# Patient Record
Sex: FEMALE | Race: WHITE | NOT HISPANIC OR LATINO | Employment: FULL TIME | URBAN - NONMETROPOLITAN AREA
[De-identification: names, ages, dates, MRNs, and addresses within clinical notes are randomized per-mention and may not be internally consistent; named-entity substitution may affect disease eponyms.]

---

## 2017-12-20 ENCOUNTER — OFFICE VISIT (OUTPATIENT)
Dept: RETAIL CLINIC | Facility: CLINIC | Age: 32
End: 2017-12-20

## 2017-12-20 VITALS
BODY MASS INDEX: 32.21 KG/M2 | SYSTOLIC BLOOD PRESSURE: 136 MMHG | TEMPERATURE: 100.5 F | HEART RATE: 95 BPM | RESPIRATION RATE: 20 BRPM | OXYGEN SATURATION: 99 % | DIASTOLIC BLOOD PRESSURE: 78 MMHG | WEIGHT: 225 LBS | HEIGHT: 70 IN

## 2017-12-20 DIAGNOSIS — J10.1 INFLUENZA A: Primary | ICD-10-CM

## 2017-12-20 LAB
EXPIRATION DATE: ABNORMAL
FLUAV AG NPH QL: POSITIVE
FLUBV AG NPH QL: ABNORMAL
INTERNAL CONTROL: ABNORMAL
Lab: ABNORMAL

## 2017-12-20 PROCEDURE — 99203 OFFICE O/P NEW LOW 30 MIN: CPT | Performed by: NURSE PRACTITIONER

## 2017-12-20 PROCEDURE — 87804 INFLUENZA ASSAY W/OPTIC: CPT | Performed by: NURSE PRACTITIONER

## 2017-12-20 RX ORDER — OSELTAMIVIR PHOSPHATE 75 MG/1
75 CAPSULE ORAL
Qty: 10 CAPSULE | Refills: 0 | Status: SHIPPED | OUTPATIENT
Start: 2017-12-20 | End: 2017-12-25

## 2017-12-20 NOTE — PROGRESS NOTES
Chief Complaint   Patient presents with   • Flu Symptoms     Subjective   Lynne Coombs is a 32 y.o. female who presents to the clinic today with complaints flu symptoms. She is on a   Flu Symptoms   This is a new problem. The current episode started yesterday. The problem occurs constantly. The problem has been gradually worsening. Associated symptoms include arthralgias, chills, congestion, coughing, fatigue, a fever, headaches, myalgias, neck pain, a rash, a sore throat, swollen glands, urinary symptoms, vertigo, a visual change, vomiting and weakness. Pertinent negatives include no abdominal pain, anorexia, change in bowel habit, chest pain, diaphoresis, nausea or numbness. Nothing aggravates the symptoms. She has tried NSAIDs for the symptoms. The treatment provided mild relief.       No current outpatient prescriptions on file.    Allergies:  Hydrocodone    History reviewed. No pertinent past medical history.  Past Surgical History:   Procedure Laterality Date   • BREAST SURGERY      augmentation      Family History   Problem Relation Age of Onset   • Diabetes Father      Social History   Substance Use Topics   • Smoking status: Current Every Day Smoker     Types: Cigarettes   • Smokeless tobacco: Never Used   • Alcohol use No       Review of Systems  Review of Systems   Constitutional: Positive for chills, fatigue and fever. Negative for diaphoresis.   HENT: Positive for congestion and sore throat.    Respiratory: Positive for cough.    Cardiovascular: Negative for chest pain.   Gastrointestinal: Positive for vomiting. Negative for abdominal pain, anorexia, change in bowel habit and nausea.   Musculoskeletal: Positive for arthralgias, myalgias and neck pain.   Skin: Positive for rash.   Neurological: Positive for vertigo, weakness and headaches. Negative for numbness.       Objective   /78 (BP Location: Left arm, Patient Position: Sitting, Cuff Size: Adult)  Pulse 95  Temp 100.5 °F (38.1 °C)  "(Tympanic)   Resp 20  Ht 176.8 cm (69.6\")  Wt 102 kg (225 lb)  LMP 11/15/2017 (Approximate)  SpO2 99%  Breastfeeding? No  BMI 32.66 kg/m2      Physical Exam   Constitutional: She is oriented to person, place, and time. She appears well-developed and well-nourished.   HENT:   Head: Normocephalic and atraumatic.   Right Ear: Hearing, external ear and ear canal normal. Tympanic membrane is bulging.   Left Ear: Hearing, external ear and ear canal normal. Tympanic membrane is bulging.   Nose: Mucosal edema and rhinorrhea present. Right sinus exhibits no maxillary sinus tenderness and no frontal sinus tenderness. Left sinus exhibits frontal sinus tenderness. Left sinus exhibits no maxillary sinus tenderness.   Mouth/Throat: Uvula is midline and mucous membranes are normal. Posterior oropharyngeal erythema present. No oropharyngeal exudate or posterior oropharyngeal edema. Tonsils are 1+ on the right. Tonsils are 1+ on the left. No tonsillar exudate.   Eyes: Pupils are equal, round, and reactive to light.   Neck: Normal range of motion. Neck supple.   Cardiovascular: Normal rate, regular rhythm and normal heart sounds.  Exam reveals no gallop and no friction rub.    No murmur heard.  Pulmonary/Chest: Effort normal and breath sounds normal. No stridor. No respiratory distress. She has no wheezes. She has no rales. She exhibits no tenderness.   Lymphadenopathy:     She has no cervical adenopathy.   Neurological: She is alert and oriented to person, place, and time.   Skin: Skin is warm and dry.   Psychiatric: She has a normal mood and affect. Her behavior is normal.   Vitals reviewed.      Assessment/Plan     Lynne was seen today for flu symptoms.    Diagnoses and all orders for this visit:    Influenza A  -     POCT Influenza A/B          "

## 2018-07-05 ENCOUNTER — TELEPHONE (OUTPATIENT)
Dept: OBSTETRICS AND GYNECOLOGY | Facility: CLINIC | Age: 33
End: 2018-07-05

## 2018-07-05 NOTE — TELEPHONE ENCOUNTER
Dr. Mcdonnell-- new pt new ob calling, LMP unknown (about 5 weeks ago). 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 Pt states that she got a positive pregnancy test last night and would like to be seen. Pt states that she also does heavy lifting at work and states that she needs something stating that she cannot do any heavy lifting before she leaves on Monday. Pt's #  [phone]

## 2018-08-07 ENCOUNTER — TELEPHONE (OUTPATIENT)
Dept: OBSTETRICS AND GYNECOLOGY | Facility: CLINIC | Age: 33
End: 2018-08-07

## 2018-08-07 DIAGNOSIS — Z34.90 PREGNANCY, UNSPECIFIED GESTATIONAL AGE: Primary | ICD-10-CM

## 2018-08-13 ENCOUNTER — OFFICE VISIT (OUTPATIENT)
Dept: OBSTETRICS AND GYNECOLOGY | Facility: CLINIC | Age: 33
End: 2018-08-13
Attending: STUDENT IN AN ORGANIZED HEALTH CARE EDUCATION/TRAINING PROGRAM
Payer: COMMERCIAL

## 2018-08-13 ENCOUNTER — PROCEDURE VISIT (OUTPATIENT)
Dept: OBSTETRICS AND GYNECOLOGY | Facility: CLINIC | Age: 33
End: 2018-08-13
Payer: COMMERCIAL

## 2018-08-13 ENCOUNTER — LAB VISIT (OUTPATIENT)
Dept: LAB | Facility: HOSPITAL | Age: 33
End: 2018-08-13
Attending: STUDENT IN AN ORGANIZED HEALTH CARE EDUCATION/TRAINING PROGRAM
Payer: COMMERCIAL

## 2018-08-13 VITALS
SYSTOLIC BLOOD PRESSURE: 132 MMHG | BODY MASS INDEX: 36.07 KG/M2 | WEIGHT: 243.5 LBS | DIASTOLIC BLOOD PRESSURE: 68 MMHG | HEIGHT: 69 IN

## 2018-08-13 DIAGNOSIS — Z11.3 SCREENING EXAMINATION FOR STD (SEXUALLY TRANSMITTED DISEASE): ICD-10-CM

## 2018-08-13 DIAGNOSIS — Z34.90 PREGNANCY, UNSPECIFIED GESTATIONAL AGE: ICD-10-CM

## 2018-08-13 DIAGNOSIS — Z3A.12 12 WEEKS GESTATION OF PREGNANCY: ICD-10-CM

## 2018-08-13 DIAGNOSIS — Z3A.12 12 WEEKS GESTATION OF PREGNANCY: Primary | ICD-10-CM

## 2018-08-13 LAB
ANION GAP SERPL CALC-SCNC: 10 MMOL/L
BASOPHILS # BLD AUTO: 0.03 K/UL
BASOPHILS NFR BLD: 0.3 %
BUN SERPL-MCNC: 12 MG/DL
CALCIUM SERPL-MCNC: 9.5 MG/DL
CHLORIDE SERPL-SCNC: 106 MMOL/L
CO2 SERPL-SCNC: 21 MMOL/L
CREAT SERPL-MCNC: 0.7 MG/DL
DIFFERENTIAL METHOD: ABNORMAL
EOSINOPHIL # BLD AUTO: 0.1 K/UL
EOSINOPHIL NFR BLD: 1.1 %
ERYTHROCYTE [DISTWIDTH] IN BLOOD BY AUTOMATED COUNT: 12.5 %
EST. GFR  (AFRICAN AMERICAN): >60 ML/MIN/1.73 M^2
EST. GFR  (NON AFRICAN AMERICAN): >60 ML/MIN/1.73 M^2
GLUCOSE SERPL-MCNC: 83 MG/DL
HCT VFR BLD AUTO: 35.8 %
HGB BLD-MCNC: 12.1 G/DL
IMM GRANULOCYTES # BLD AUTO: 0.03 K/UL
IMM GRANULOCYTES NFR BLD AUTO: 0.3 %
LYMPHOCYTES # BLD AUTO: 2 K/UL
LYMPHOCYTES NFR BLD: 21.5 %
MCH RBC QN AUTO: 30 PG
MCHC RBC AUTO-ENTMCNC: 33.8 G/DL
MCV RBC AUTO: 89 FL
MONOCYTES # BLD AUTO: 0.6 K/UL
MONOCYTES NFR BLD: 6.2 %
NEUTROPHILS # BLD AUTO: 6.6 K/UL
NEUTROPHILS NFR BLD: 70.6 %
NRBC BLD-RTO: 0 /100 WBC
PLATELET # BLD AUTO: 296 K/UL
PMV BLD AUTO: 9.5 FL
POTASSIUM SERPL-SCNC: 4 MMOL/L
RBC # BLD AUTO: 4.04 M/UL
SODIUM SERPL-SCNC: 137 MMOL/L
T4 FREE SERPL-MCNC: 0.89 NG/DL
TSH SERPL DL<=0.005 MIU/L-ACNC: 1.98 UIU/ML
WBC # BLD AUTO: 9.31 K/UL

## 2018-08-13 PROCEDURE — 85025 COMPLETE CBC W/AUTO DIFF WBC: CPT

## 2018-08-13 PROCEDURE — 3008F BODY MASS INDEX DOCD: CPT | Mod: CPTII,S$GLB,, | Performed by: STUDENT IN AN ORGANIZED HEALTH CARE EDUCATION/TRAINING PROGRAM

## 2018-08-13 PROCEDURE — 87510 GARDNER VAG DNA DIR PROBE: CPT

## 2018-08-13 PROCEDURE — 87480 CANDIDA DNA DIR PROBE: CPT

## 2018-08-13 PROCEDURE — 87340 HEPATITIS B SURFACE AG IA: CPT

## 2018-08-13 PROCEDURE — 84439 ASSAY OF FREE THYROXINE: CPT

## 2018-08-13 PROCEDURE — 86703 HIV-1/HIV-2 1 RESULT ANTBDY: CPT

## 2018-08-13 PROCEDURE — 88175 CYTOPATH C/V AUTO FLUID REDO: CPT

## 2018-08-13 PROCEDURE — 80048 BASIC METABOLIC PNL TOTAL CA: CPT

## 2018-08-13 PROCEDURE — 84443 ASSAY THYROID STIM HORMONE: CPT

## 2018-08-13 PROCEDURE — 87491 CHLMYD TRACH DNA AMP PROBE: CPT

## 2018-08-13 PROCEDURE — 86762 RUBELLA ANTIBODY: CPT

## 2018-08-13 PROCEDURE — 86592 SYPHILIS TEST NON-TREP QUAL: CPT

## 2018-08-13 PROCEDURE — 76801 OB US < 14 WKS SINGLE FETUS: CPT | Mod: S$GLB,,, | Performed by: OBSTETRICS & GYNECOLOGY

## 2018-08-13 PROCEDURE — 87624 HPV HI-RISK TYP POOLED RSLT: CPT

## 2018-08-13 PROCEDURE — 99213 OFFICE O/P EST LOW 20 MIN: CPT | Mod: S$GLB,,, | Performed by: STUDENT IN AN ORGANIZED HEALTH CARE EDUCATION/TRAINING PROGRAM

## 2018-08-13 PROCEDURE — 87086 URINE CULTURE/COLONY COUNT: CPT

## 2018-08-13 PROCEDURE — 99999 PR PBB SHADOW E&M-EST. PATIENT-LVL III: CPT | Mod: PBBFAC,,, | Performed by: STUDENT IN AN ORGANIZED HEALTH CARE EDUCATION/TRAINING PROGRAM

## 2018-08-14 ENCOUNTER — TELEPHONE (OUTPATIENT)
Dept: OBSTETRICS AND GYNECOLOGY | Facility: CLINIC | Age: 33
End: 2018-08-14

## 2018-08-14 LAB
BACTERIA UR CULT: NORMAL
C TRACH DNA SPEC QL NAA+PROBE: NOT DETECTED
CANDIDA RRNA VAG QL PROBE: POSITIVE
G VAGINALIS RRNA GENITAL QL PROBE: NEGATIVE
HBV SURFACE AG SERPL QL IA: NEGATIVE
HIV 1+2 AB+HIV1 P24 AG SERPL QL IA: NEGATIVE
N GONORRHOEA DNA SPEC QL NAA+PROBE: NOT DETECTED
RPR SER QL: NORMAL
RUBV IGG SER-ACNC: 12.7 IU/ML
RUBV IGG SER-IMP: REACTIVE
T VAGINALIS RRNA GENITAL QL PROBE: NEGATIVE

## 2018-08-14 RX ORDER — TERCONAZOLE 4 MG/G
1 CREAM VAGINAL NIGHTLY
Qty: 45 G | Refills: 0 | Status: SHIPPED | OUTPATIENT
Start: 2018-08-14 | End: 2018-09-19

## 2018-08-14 NOTE — TELEPHONE ENCOUNTER
Phone call made to patient to discuss results.  All questions answered.  Rx to pharmacy. GC/CT, pap, type screen pending.

## 2018-08-14 NOTE — PROGRESS NOTES
"Chief Complaint: Absence of Menses     HPI:      Glenis Staples is a 33 y.o.  who presents complaining of absence of menses.  She reports her LMP was sometime in early . Denies vaginal bleeding or cramping.  Nausea resolved.  No other issues or concerns.  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    GYN Hx:  LMP .  Menarche 14 with cycles occurring every 28 days and menses lasting 5 days.   Past Medical History:   Diagnosis Date    Acid reflux     HPV in female      Past Surgical History:   Procedure Laterality Date    BREAST SURGERY       Social History     Tobacco Use    Smoking status: Never Smoker    Smokeless tobacco: Never Used   Substance Use Topics    Alcohol use: No     Frequency: Never    Drug use: No     Family History   Problem Relation Age of Onset    Breast cancer Paternal Grandmother     Colon cancer Neg Hx     Ovarian cancer Neg Hx      OB History    Para Term  AB Living   1             SAB TAB Ectopic Multiple Live Births                  # Outcome Date GA Lbr Kanu/2nd Weight Sex Delivery Anes PTL Lv   1 Current                   ROS:     GENERAL: Denies weight gain or weight loss. Feeling well overall.   SKIN: Denies rash or lesions.   BREASTS: Denies lumps or nipple discharge. + tenderness.  ABDOMEN: Denies abdominal pain, constipation, diarrhea. no nausea/no vomiting  URINARY: Denies dysuria, hematuria. + frequency.  NEUROLOGIC: Denies syncope or weakness.   PSYCHIATRIC: Denies depression, anxiety or mood swings.    Physical Exam:      PHYSICAL EXAM:  /68   Ht 5' 9" (1.753 m)   Wt 110.5 kg (243 lb 8 oz)   LMP 2018 (Approximate)   BMI 35.96 kg/m²   Body mass index is 35.96 kg/m².     APPEARANCE: Well nourished, well developed, in no acute distress.  PSYCH: Appropriate mood and affect.  NECK:  Supple, no thyromegaly.  BREASTS:  No masses, no nipple discharge.  CARDIOVASCULAR:  Regular rate and rhythm.  LUNGS:  Clear to " auscultation bilaterally.  ABDOMEN:  Soft, nontender.  GENITOURINARY:  External genitalia normal in appearance, normal perineal body, normal urethral meatus.  Vagina with scant discharge, no blood.  No lesions.  Cervix without lesion, C/T/H.  Uterus 12 week size.  Adnexa without masses or TTP.    EXTREMITIES: No edema.      Assessment/Plan:       ICD-10-CM ICD-9-CM    1. 12 weeks gestation of pregnancy Z3A.12 V22.2 Liquid-based pap smear, screening      HPV High Risk Genotypes, PCR      C. trachomatis/N. gonorrhoeae by AMP DNA      Urine culture      Vaginosis Screen by DNA Probe      HIV-1 and HIV-2 antibodies      RPR      Hepatitis B surface antigen      Type & Screen      Rubella antibody, IgG      Urine culture      CBC auto differential      Basic metabolic panel      US MFM Procedure ([facility])      TSH      T4, free   2. Screening examination for STD (sexually transmitted disease) Z11.3 V74.5          Counselin. Patient was counseled today on proper weight gain based on the [place]'s recommendations based on her pre-pregnancy weight.   2. Discussed foods to avoid in pregnancy (i.e. sushi, fish that are high in mercury, deli meat, and unpasteurized cheeses).   3. Discussed prenatal vitamin options and folic acid (i.e. stool softener, DHA).   4. Optional genetic testing discussed - patient declines.  5. Safety of exercise discussed with patient, and continued active lifestyle encouraged.  6. Zika virus precautions for both patient and her spouse.  7. Normal course of prenatal care reviewed.  8. Medications safe in pregnancy discussed and list provided.  9.  Exam performed today and initial blood work ordered.  Will follow up results.  All questions answered.     30 minutes of face-to-face discussion occurred during today's visit.     Use of the [facility] Patient Portal discussed and encouraged during today's visit.

## 2018-08-15 ENCOUNTER — TELEPHONE (OUTPATIENT)
Dept: OBSTETRICS AND GYNECOLOGY | Facility: CLINIC | Age: 33
End: 2018-08-15

## 2018-08-15 NOTE — TELEPHONE ENCOUNTER
----- Message from Mindy Pascal MD sent at 8/15/2018  1:46 PM CDT -----  Can we see what happened to her type and screen?  Is it pending?  Just want to make sure I'm not putting things in wrong.  Thanks!

## 2018-08-15 NOTE — TELEPHONE ENCOUNTER
Spoke with Pat at the lab and she confirmed that all orders were linked and in properly but didn't give reason as to why the type and screen wasn't collected. She was absent on 8/13 when pt was seen.

## 2018-08-17 ENCOUNTER — TELEPHONE (OUTPATIENT)
Dept: OBSTETRICS AND GYNECOLOGY | Facility: CLINIC | Age: 33
End: 2018-08-17

## 2018-08-17 LAB
HPV HR 12 DNA CVX QL NAA+PROBE: NEGATIVE
HPV16 AG SPEC QL: NEGATIVE
HPV18 DNA SPEC QL NAA+PROBE: NEGATIVE

## 2018-08-17 NOTE — TELEPHONE ENCOUNTER
----- Message from Midny Pascal MD sent at 8/17/2018  2:03 PM CDT -----  Please call patient:    Your pap smear is back and everything looks completely normal. This is great news! Based on current guidelines you don't need another pap smear for 3 years. We do still recommend that you come back to clinic each year for your annual exam. This gives us a chance to make sure you're doing well and no problems have developed since your last visit.  Please let me know if you have any questions or concerns.  We will see her soon.  Thanks!

## 2018-09-19 ENCOUNTER — TELEPHONE (OUTPATIENT)
Dept: OBSTETRICS AND GYNECOLOGY | Facility: CLINIC | Age: 33
End: 2018-09-19

## 2018-09-19 ENCOUNTER — ROUTINE PRENATAL (OUTPATIENT)
Dept: OBSTETRICS AND GYNECOLOGY | Facility: CLINIC | Age: 33
End: 2018-09-19
Attending: STUDENT IN AN ORGANIZED HEALTH CARE EDUCATION/TRAINING PROGRAM
Payer: COMMERCIAL

## 2018-09-19 VITALS
SYSTOLIC BLOOD PRESSURE: 126 MMHG | BODY MASS INDEX: 36.46 KG/M2 | DIASTOLIC BLOOD PRESSURE: 78 MMHG | WEIGHT: 246.94 LBS

## 2018-09-19 DIAGNOSIS — Z3A.17 17 WEEKS GESTATION OF PREGNANCY: Primary | ICD-10-CM

## 2018-09-19 PROCEDURE — 90471 IMMUNIZATION ADMIN: CPT | Mod: S$GLB,,, | Performed by: STUDENT IN AN ORGANIZED HEALTH CARE EDUCATION/TRAINING PROGRAM

## 2018-09-19 PROCEDURE — 99999 PR PBB SHADOW E&M-EST. PATIENT-LVL II: CPT | Mod: PBBFAC,,, | Performed by: STUDENT IN AN ORGANIZED HEALTH CARE EDUCATION/TRAINING PROGRAM

## 2018-09-19 PROCEDURE — 90686 IIV4 VACC NO PRSV 0.5 ML IM: CPT | Mod: S$GLB,,, | Performed by: STUDENT IN AN ORGANIZED HEALTH CARE EDUCATION/TRAINING PROGRAM

## 2018-09-19 PROCEDURE — 0502F SUBSEQUENT PRENATAL CARE: CPT | Mod: S$GLB,,, | Performed by: STUDENT IN AN ORGANIZED HEALTH CARE EDUCATION/TRAINING PROGRAM

## 2018-09-19 NOTE — PROGRESS NOTES
Doing well.  No complaints.  No cramping or bleeding.  Flu today.  Anatomy ordered.  All questions answered.  RTC in 4 weeks or sooner if needed.

## 2018-10-03 ENCOUNTER — PROCEDURE VISIT (OUTPATIENT)
Dept: MATERNAL FETAL MEDICINE | Facility: CLINIC | Age: 33
End: 2018-10-03
Attending: STUDENT IN AN ORGANIZED HEALTH CARE EDUCATION/TRAINING PROGRAM
Payer: COMMERCIAL

## 2018-10-03 DIAGNOSIS — Z36.89 ENCOUNTER FOR FETAL ANATOMIC SURVEY: ICD-10-CM

## 2018-10-03 DIAGNOSIS — Z3A.12 12 WEEKS GESTATION OF PREGNANCY: ICD-10-CM

## 2018-10-03 DIAGNOSIS — O99.210 MATERNAL OBESITY AFFECTING PREGNANCY, ANTEPARTUM: ICD-10-CM

## 2018-10-03 DIAGNOSIS — Z36.89 ENCOUNTER FOR ULTRASOUND TO CHECK FETAL GROWTH: Primary | ICD-10-CM

## 2018-10-03 PROCEDURE — 76811 OB US DETAILED SNGL FETUS: CPT | Mod: S$GLB,,, | Performed by: OBSTETRICS & GYNECOLOGY

## 2018-10-03 PROCEDURE — 99499 UNLISTED E&M SERVICE: CPT | Mod: S$GLB,,, | Performed by: OBSTETRICS & GYNECOLOGY

## 2018-10-10 ENCOUNTER — ROUTINE PRENATAL (OUTPATIENT)
Dept: OBSTETRICS AND GYNECOLOGY | Facility: CLINIC | Age: 33
End: 2018-10-10
Attending: STUDENT IN AN ORGANIZED HEALTH CARE EDUCATION/TRAINING PROGRAM
Payer: COMMERCIAL

## 2018-10-10 ENCOUNTER — PATIENT MESSAGE (OUTPATIENT)
Dept: ADMINISTRATIVE | Facility: OTHER | Age: 33
End: 2018-10-10

## 2018-10-10 ENCOUNTER — LAB VISIT (OUTPATIENT)
Dept: LAB | Facility: HOSPITAL | Age: 33
End: 2018-10-10
Attending: STUDENT IN AN ORGANIZED HEALTH CARE EDUCATION/TRAINING PROGRAM
Payer: COMMERCIAL

## 2018-10-10 VITALS
DIASTOLIC BLOOD PRESSURE: 84 MMHG | WEIGHT: 251.13 LBS | BODY MASS INDEX: 37.08 KG/M2 | SYSTOLIC BLOOD PRESSURE: 126 MMHG

## 2018-10-10 DIAGNOSIS — Z3A.20 20 WEEKS GESTATION OF PREGNANCY: Primary | ICD-10-CM

## 2018-10-10 DIAGNOSIS — Z3A.20 20 WEEKS GESTATION OF PREGNANCY: ICD-10-CM

## 2018-10-10 LAB
ABO + RH BLD: NORMAL
BLD GP AB SCN CELLS X3 SERPL QL: NORMAL

## 2018-10-10 PROCEDURE — 86901 BLOOD TYPING SEROLOGIC RH(D): CPT

## 2018-10-10 PROCEDURE — 0502F SUBSEQUENT PRENATAL CARE: CPT | Mod: S$GLB,,, | Performed by: STUDENT IN AN ORGANIZED HEALTH CARE EDUCATION/TRAINING PROGRAM

## 2018-10-10 PROCEDURE — 99999 PR PBB SHADOW E&M-EST. PATIENT-LVL III: CPT | Mod: PBBFAC,,, | Performed by: STUDENT IN AN ORGANIZED HEALTH CARE EDUCATION/TRAINING PROGRAM

## 2018-10-10 NOTE — PROGRESS NOTES
Doing well.  No complaints.  Reports feeling some kicks.  Denies contractions, leakage of fluid, vaginal bleeding.  Is now considering [id number] testing.  Information given and patient will call with what she decides.  All questions answered.  RTC in 4 weeks or sooner if needed.

## 2018-10-11 ENCOUNTER — PATIENT OUTREACH (OUTPATIENT)
Dept: OTHER | Facility: OTHER | Age: 33
End: 2018-10-11

## 2018-10-11 NOTE — TELEPHONE ENCOUNTER
Initial introduction with Ms. Glenis Staples completed and the role of the health coaches for [facility] was explained via [facility].     Reviewed the importance of taking weights and blood pressure readings, using the health  as a resource for physical activity and dietary habits, and that [facility] messages will be sent for weeks 20, 30, and 37 home urine tests.  Reviewed that the patient should contact her OB team with any specific questions regarding her pregnancy, and to contact [facility] or 911 in the case of a medical emergency.

## 2018-10-29 ENCOUNTER — ROUTINE PRENATAL (OUTPATIENT)
Dept: OBSTETRICS AND GYNECOLOGY | Facility: CLINIC | Age: 33
End: 2018-10-29
Attending: STUDENT IN AN ORGANIZED HEALTH CARE EDUCATION/TRAINING PROGRAM
Payer: COMMERCIAL

## 2018-10-29 ENCOUNTER — PATIENT MESSAGE (OUTPATIENT)
Dept: OBSTETRICS AND GYNECOLOGY | Facility: CLINIC | Age: 33
End: 2018-10-29

## 2018-10-29 ENCOUNTER — PROCEDURE VISIT (OUTPATIENT)
Dept: MATERNAL FETAL MEDICINE | Facility: CLINIC | Age: 33
End: 2018-10-29
Attending: STUDENT IN AN ORGANIZED HEALTH CARE EDUCATION/TRAINING PROGRAM
Payer: COMMERCIAL

## 2018-10-29 VITALS
DIASTOLIC BLOOD PRESSURE: 76 MMHG | BODY MASS INDEX: 38.89 KG/M2 | WEIGHT: 263.31 LBS | SYSTOLIC BLOOD PRESSURE: 126 MMHG

## 2018-10-29 DIAGNOSIS — O99.210 MATERNAL OBESITY AFFECTING PREGNANCY, ANTEPARTUM: ICD-10-CM

## 2018-10-29 DIAGNOSIS — Z36.89 ENCOUNTER FOR ULTRASOUND TO CHECK FETAL GROWTH: Primary | ICD-10-CM

## 2018-10-29 DIAGNOSIS — Z36.89 ENCOUNTER FOR ULTRASOUND TO CHECK FETAL GROWTH: ICD-10-CM

## 2018-10-29 DIAGNOSIS — O12.02 LEG SWELLING IN PREGNANCY IN SECOND TRIMESTER: ICD-10-CM

## 2018-10-29 DIAGNOSIS — Z3A.23 23 WEEKS GESTATION OF PREGNANCY: Primary | ICD-10-CM

## 2018-10-29 DIAGNOSIS — Z34.02 ENCOUNTER FOR SUPERVISION OF NORMAL FIRST PREGNANCY, SECOND TRIMESTER: ICD-10-CM

## 2018-10-29 PROCEDURE — 0502F SUBSEQUENT PRENATAL CARE: CPT | Mod: S$GLB,,, | Performed by: NURSE PRACTITIONER

## 2018-10-29 PROCEDURE — 76816 OB US FOLLOW-UP PER FETUS: CPT | Mod: S$GLB,,, | Performed by: OBSTETRICS & GYNECOLOGY

## 2018-10-29 PROCEDURE — 99499 UNLISTED E&M SERVICE: CPT | Mod: S$GLB,,, | Performed by: OBSTETRICS & GYNECOLOGY

## 2018-10-29 PROCEDURE — 99999 PR PBB SHADOW E&M-EST. PATIENT-LVL II: CPT | Mod: PBBFAC,,, | Performed by: NURSE PRACTITIONER

## 2018-10-29 NOTE — PROGRESS NOTES
Here after MFM F/U anatomy scan.  States all went well.  She was rescheduled again in 4 wks (11/27/18) for f/u anatomy scan for suboptimal images.  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  Only complaint today is swelling to her feet/ankles.  States she does drink soft drinks fairly often and doesn't really monitor her sodium intake that well either.  Advised pt to limit soft drinks, and to avoid high fat/high sodium/fried foods, and to drink plenty water.  Also advised her to elevate her feet at night when she can.  Labor/bleeding prec given.  Lab orders placed for GTT/CBC at next visit due in 4 weeks.

## 2018-11-12 ENCOUNTER — TELEPHONE (OUTPATIENT)
Dept: OBSTETRICS AND GYNECOLOGY | Facility: CLINIC | Age: 33
End: 2018-11-12

## 2018-11-12 NOTE — TELEPHONE ENCOUNTER
----- Message from Gabby Castañeda NP sent at 10/29/2018  7:31 PM CDT -----  Regarding: routine ob appt & labs  I'm putting in orders right now for her GTT/CBC labs to be done at her next visit in 4 weeks (she will be 27 weeks).  She has a 3rd f/u appt at [facility] to complete her anatomy scan, but needs:    # Routine OB appt with Roger Gomez at [facility] (she has a 10, 10:15, 10:45 slot open) before her [facility] u/s  # Labs scheduled as well at [facility]    I'm going to email the patient and let her know that I've messaged you, and maybe you can just put her down on the schedule or email her and see what time works best for her that day?      Thank you,  Gabby

## 2018-11-27 ENCOUNTER — PROCEDURE VISIT (OUTPATIENT)
Dept: MATERNAL FETAL MEDICINE | Facility: CLINIC | Age: 33
End: 2018-11-27
Payer: COMMERCIAL

## 2018-11-27 DIAGNOSIS — Z36.89 ENCOUNTER FOR ULTRASOUND TO CHECK FETAL GROWTH: ICD-10-CM

## 2018-11-27 PROCEDURE — 76816 OB US FOLLOW-UP PER FETUS: CPT | Mod: S$GLB,,, | Performed by: OBSTETRICS & GYNECOLOGY

## 2018-11-28 ENCOUNTER — TELEPHONE (OUTPATIENT)
Dept: OBSTETRICS AND GYNECOLOGY | Facility: CLINIC | Age: 33
End: 2018-11-28

## 2018-11-28 NOTE — TELEPHONE ENCOUNTER
----- Message from Mindy Pascal MD sent at 11/28/2018  3:29 PM CST -----  This patient is out of town a lot and is coming in next week.  Can we make sure she is scheduled for her glucose screen and CBC at the same time?  Thank you so mucH!!

## 2018-12-03 ENCOUNTER — LAB VISIT (OUTPATIENT)
Dept: LAB | Facility: HOSPITAL | Age: 33
End: 2018-12-03
Attending: STUDENT IN AN ORGANIZED HEALTH CARE EDUCATION/TRAINING PROGRAM
Payer: COMMERCIAL

## 2018-12-03 ENCOUNTER — CLINICAL SUPPORT (OUTPATIENT)
Dept: OBSTETRICS AND GYNECOLOGY | Facility: CLINIC | Age: 33
End: 2018-12-03
Payer: COMMERCIAL

## 2018-12-03 ENCOUNTER — ROUTINE PRENATAL (OUTPATIENT)
Dept: OBSTETRICS AND GYNECOLOGY | Facility: CLINIC | Age: 33
End: 2018-12-03
Attending: STUDENT IN AN ORGANIZED HEALTH CARE EDUCATION/TRAINING PROGRAM
Payer: COMMERCIAL

## 2018-12-03 VITALS
WEIGHT: 276.81 LBS | SYSTOLIC BLOOD PRESSURE: 118 MMHG | DIASTOLIC BLOOD PRESSURE: 76 MMHG | BODY MASS INDEX: 40.87 KG/M2

## 2018-12-03 DIAGNOSIS — Z3A.28 28 WEEKS GESTATION OF PREGNANCY: Primary | ICD-10-CM

## 2018-12-03 DIAGNOSIS — Z34.02 ENCOUNTER FOR SUPERVISION OF NORMAL FIRST PREGNANCY, SECOND TRIMESTER: ICD-10-CM

## 2018-12-03 DIAGNOSIS — Z23 NEED FOR TDAP VACCINATION: Primary | ICD-10-CM

## 2018-12-03 DIAGNOSIS — Z3A.23 23 WEEKS GESTATION OF PREGNANCY: ICD-10-CM

## 2018-12-03 LAB
BASOPHILS # BLD AUTO: 0.04 K/UL
BASOPHILS NFR BLD: 0.4 %
DIFFERENTIAL METHOD: ABNORMAL
EOSINOPHIL # BLD AUTO: 0.1 K/UL
EOSINOPHIL NFR BLD: 1.2 %
ERYTHROCYTE [DISTWIDTH] IN BLOOD BY AUTOMATED COUNT: 12.4 %
GLUCOSE SERPL-MCNC: 150 MG/DL
HCT VFR BLD AUTO: 34.1 %
HGB BLD-MCNC: 11.2 G/DL
IMM GRANULOCYTES # BLD AUTO: 0.04 K/UL
IMM GRANULOCYTES NFR BLD AUTO: 0.4 %
LYMPHOCYTES # BLD AUTO: 1.6 K/UL
LYMPHOCYTES NFR BLD: 17.6 %
MCH RBC QN AUTO: 29.6 PG
MCHC RBC AUTO-ENTMCNC: 32.8 G/DL
MCV RBC AUTO: 90 FL
MONOCYTES # BLD AUTO: 0.6 K/UL
MONOCYTES NFR BLD: 6.1 %
NEUTROPHILS # BLD AUTO: 6.7 K/UL
NEUTROPHILS NFR BLD: 74.3 %
NRBC BLD-RTO: 0 /100 WBC
PLATELET # BLD AUTO: 312 K/UL
PMV BLD AUTO: 9.5 FL
RBC # BLD AUTO: 3.79 M/UL
WBC # BLD AUTO: 8.98 K/UL

## 2018-12-03 PROCEDURE — 99999 PR PBB SHADOW E&M-EST. PATIENT-LVL I: CPT | Mod: PBBFAC,,,

## 2018-12-03 PROCEDURE — 99999 PR PBB SHADOW E&M-EST. PATIENT-LVL III: CPT | Mod: PBBFAC,,, | Performed by: STUDENT IN AN ORGANIZED HEALTH CARE EDUCATION/TRAINING PROGRAM

## 2018-12-03 PROCEDURE — 90715 TDAP VACCINE 7 YRS/> IM: CPT | Mod: S$GLB,,, | Performed by: STUDENT IN AN ORGANIZED HEALTH CARE EDUCATION/TRAINING PROGRAM

## 2018-12-03 PROCEDURE — 0502F SUBSEQUENT PRENATAL CARE: CPT | Mod: S$GLB,,, | Performed by: STUDENT IN AN ORGANIZED HEALTH CARE EDUCATION/TRAINING PROGRAM

## 2018-12-03 PROCEDURE — 82950 GLUCOSE TEST: CPT

## 2018-12-03 PROCEDURE — 85025 COMPLETE CBC W/AUTO DIFF WBC: CPT

## 2018-12-03 PROCEDURE — 90471 IMMUNIZATION ADMIN: CPT | Mod: S$GLB,,, | Performed by: STUDENT IN AN ORGANIZED HEALTH CARE EDUCATION/TRAINING PROGRAM

## 2018-12-03 NOTE — PROGRESS NOTES
Ordering Provider: Dr. Pascal    During visit today patient received an injection of Tdap to left deltoid.  Tolerated well.  Instructed pt to remain 15 minutes after injection to monitor for reactions.      Pre pain scale: none    Post pain scale: none

## 2018-12-04 ENCOUNTER — TELEPHONE (OUTPATIENT)
Dept: OBSTETRICS AND GYNECOLOGY | Facility: CLINIC | Age: 33
End: 2018-12-04

## 2018-12-04 DIAGNOSIS — O99.810 ABNORMAL GLUCOSE TOLERANCE TEST (GTT) DURING PREGNANCY, ANTEPARTUM: Primary | ICD-10-CM

## 2018-12-04 NOTE — TELEPHONE ENCOUNTER
Pt saw Gabby's portal message about her glucose results and the need for the 3 hr gtt. She is also aware of the need for an iron pill daily.     Pt scheduled 3 hr gtt for tomorrow. She is aware she is to fast.

## 2018-12-04 NOTE — TELEPHONE ENCOUNTER
"----- Message from Gabby Castañeda NP sent at 12/4/2018 12:52 AM CST -----  Please call patient & make sure she viewed and/or received my portal message below - I have already placed the orders for the 3-hr GTT, so it just needs to be scheduled when she is called - - - -     Amna Galvin,             The results of your gestational diabetes test have come back, and unfortunately, the glucose levels were a little high. We need to do a second test to see whether or not you truly do have gestational diabetes. This next test is a 3 hour test.             We will check your glucose levels when you first arrive, then you'll drink some more of that delightful glucose solution and we will draw your blood at 1, 2, and 3 hours.     It's important that you are fasting when you get this one done, so nothing to eat and only water to drink after midnight the night before your test.               Also, your blood count is a little low, consistent with anemia.  This is very common in pregnancy.  I recommend you take one iron tablet every day.  You can get this over the counter at the drugstore.  One example is "Slow FE" but there are many options.  If the iron makes you have constipation, then at least take one pill every OTHER day.     If you don't hear from us first, please call the office to set up a time to come have the test done. ([phone])    Let me know if you have any questions,   ZAHRA Ramirez    "

## 2018-12-04 NOTE — PROGRESS NOTES
Doing well.  No complaints.  Reports fetal movement.  Denies contractions, leakage of fluid, vaginal bleeding.  TDAP today.  Glucose and CBC ordered.  Discussed weight gain in pregnancy and recommendations.  Reviewed diet and exercise.  All questions answered.  RTC in 2 weeks or sooner if needed.

## 2018-12-05 ENCOUNTER — LAB VISIT (OUTPATIENT)
Dept: LAB | Facility: HOSPITAL | Age: 33
End: 2018-12-05
Attending: STUDENT IN AN ORGANIZED HEALTH CARE EDUCATION/TRAINING PROGRAM
Payer: COMMERCIAL

## 2018-12-05 DIAGNOSIS — O99.810 ABNORMAL GLUCOSE TOLERANCE TEST (GTT) DURING PREGNANCY, ANTEPARTUM: ICD-10-CM

## 2018-12-05 LAB
GLUCOSE SERPL-MCNC: 108 MG/DL
GLUCOSE SERPL-MCNC: 198 MG/DL
GLUCOSE SERPL-MCNC: 202 MG/DL
GLUCOSE SERPL-MCNC: 99 MG/DL

## 2018-12-05 PROCEDURE — 82952 GTT-ADDED SAMPLES: CPT

## 2018-12-05 PROCEDURE — 82951 GLUCOSE TOLERANCE TEST (GTT): CPT

## 2018-12-06 ENCOUNTER — PATIENT MESSAGE (OUTPATIENT)
Dept: OBSTETRICS AND GYNECOLOGY | Facility: CLINIC | Age: 33
End: 2018-12-06

## 2018-12-06 DIAGNOSIS — O99.810 ABNORMAL GLUCOSE TOLERANCE TEST (GTT) DURING PREGNANCY, ANTEPARTUM: Primary | ICD-10-CM

## 2018-12-11 ENCOUNTER — PATIENT MESSAGE (OUTPATIENT)
Dept: DIABETES | Facility: OTHER | Age: 33
End: 2018-12-11

## 2018-12-12 ENCOUNTER — PATIENT OUTREACH (OUTPATIENT)
Dept: DIABETES | Facility: OTHER | Age: 33
End: 2018-12-12

## 2018-12-12 DIAGNOSIS — O24.419 GESTATIONAL DIABETES MELLITUS (GDM) IN THIRD TRIMESTER, GESTATIONAL DIABETES METHOD OF CONTROL UNSPECIFIED: Primary | ICD-10-CM

## 2018-12-13 ENCOUNTER — PATIENT MESSAGE (OUTPATIENT)
Dept: INTERNAL MEDICINE | Facility: CLINIC | Age: 33
End: 2018-12-13

## 2018-12-19 ENCOUNTER — PATIENT OUTREACH (OUTPATIENT)
Dept: OTHER | Facility: OTHER | Age: 33
End: 2018-12-19

## 2018-12-19 NOTE — TELEPHONE ENCOUNTER
LVM reminding patient that week 30 home urine test is due for [facility]. Asked that she check [facility] messages for instructions and link to submit results.

## 2018-12-21 ENCOUNTER — TELEPHONE (OUTPATIENT)
Dept: OBSTETRICS AND GYNECOLOGY | Facility: CLINIC | Age: 33
End: 2018-12-21

## 2018-12-21 NOTE — TELEPHONE ENCOUNTER
Spoke with patient, patient stated she had no service so no one was able to contact her but can now receive calls, I massage Marietta Hendricks about reaching out to pt for an appointment

## 2018-12-27 ENCOUNTER — TELEPHONE (OUTPATIENT)
Dept: OBSTETRICS AND GYNECOLOGY | Facility: CLINIC | Age: 33
End: 2018-12-27

## 2018-12-27 ENCOUNTER — PATIENT MESSAGE (OUTPATIENT)
Dept: OBSTETRICS AND GYNECOLOGY | Facility: CLINIC | Age: 33
End: 2018-12-27

## 2018-12-27 NOTE — TELEPHONE ENCOUNTER
Dr. 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 Ship dr sharp it was notherng, but mother wants pt to go to hospital to be checked since she is so far along, Advised patient to communicate on portal immediately, but call mother also to insutrst pt

## 2018-12-31 ENCOUNTER — PATIENT MESSAGE (OUTPATIENT)
Dept: MATERNAL FETAL MEDICINE | Facility: CLINIC | Age: 33
End: 2018-12-31

## 2019-01-03 ENCOUNTER — ROUTINE PRENATAL (OUTPATIENT)
Dept: OBSTETRICS AND GYNECOLOGY | Facility: CLINIC | Age: 34
End: 2019-01-03
Attending: STUDENT IN AN ORGANIZED HEALTH CARE EDUCATION/TRAINING PROGRAM
Payer: COMMERCIAL

## 2019-01-03 ENCOUNTER — TELEPHONE (OUTPATIENT)
Dept: OBSTETRICS AND GYNECOLOGY | Facility: CLINIC | Age: 34
End: 2019-01-03

## 2019-01-03 VITALS
WEIGHT: 270.56 LBS | SYSTOLIC BLOOD PRESSURE: 122 MMHG | DIASTOLIC BLOOD PRESSURE: 74 MMHG | BODY MASS INDEX: 39.96 KG/M2

## 2019-01-03 DIAGNOSIS — Z3A.33 33 WEEKS GESTATION OF PREGNANCY: Primary | ICD-10-CM

## 2019-01-03 PROCEDURE — 0502F SUBSEQUENT PRENATAL CARE: CPT | Mod: S$GLB,,, | Performed by: STUDENT IN AN ORGANIZED HEALTH CARE EDUCATION/TRAINING PROGRAM

## 2019-01-03 PROCEDURE — 0502F PR SUBSEQUENT PRENATAL CARE: ICD-10-PCS | Mod: S$GLB,,, | Performed by: STUDENT IN AN ORGANIZED HEALTH CARE EDUCATION/TRAINING PROGRAM

## 2019-01-03 PROCEDURE — 99999 PR PBB SHADOW E&M-EST. PATIENT-LVL II: CPT | Mod: PBBFAC,,, | Performed by: STUDENT IN AN ORGANIZED HEALTH CARE EDUCATION/TRAINING PROGRAM

## 2019-01-03 PROCEDURE — 99999 PR PBB SHADOW E&M-EST. PATIENT-LVL II: ICD-10-PCS | Mod: PBBFAC,,, | Performed by: STUDENT IN AN ORGANIZED HEALTH CARE EDUCATION/TRAINING PROGRAM

## 2019-01-03 RX ORDER — LANCETS
1 EACH MISCELLANEOUS 4 TIMES DAILY
Qty: 200 EACH | Refills: 3 | Status: ON HOLD | OUTPATIENT
Start: 2019-01-03 | End: 2019-02-19 | Stop reason: HOSPADM

## 2019-01-03 RX ORDER — DEXTROSE 4 G
1 TABLET,CHEWABLE ORAL 4 TIMES DAILY
Qty: 1 EACH | Refills: 0 | Status: SHIPPED | OUTPATIENT
Start: 2019-01-03 | End: 2019-05-28

## 2019-01-03 NOTE — LETTER
Alfie Us M.D.  Tristan Caldera M.D..  Jennifer Carl M.D.  Celestina Hughes M.D.  Monika Laura M.D.                                                                              Loreto Dunham M.D.  JERAD Chow M.D Geoffrey Gillen, M.D.                                                                                    [address] [place] [id number]  Tel [phone]  Fax [phone]        January 3, 2019    Glenis Staples  [address] [address]        To Whom It May Concern:     is currently under our care for pregnancy; Estimated Date of Delivery: 2/22/19.Due to medical reasons is unable to return to work until after delivery starting 1/3/2019.        Sincerely,        Mindy Pascal MD

## 2019-01-03 NOTE — TELEPHONE ENCOUNTER
----- Message from Mindy Pascal MD sent at 1/3/2019 10:54 AM CST -----  Please call to confirm the pharmacy has the info.  Thanks!

## 2019-01-08 ENCOUNTER — TELEPHONE (OUTPATIENT)
Dept: OBSTETRICS AND GYNECOLOGY | Facility: CLINIC | Age: 34
End: 2019-01-08

## 2019-01-08 NOTE — TELEPHONE ENCOUNTER
----- Message from Marietta Hendricks sent at 1/8/2019  9:05 AM CST -----  I attempted to reach her again, but received voice mail. I did leave a message for her to return my call with my direct line.   Thank you,  Marietta     ----- Message -----  From: Fred Lu MA  Sent: 12/21/2018  12:59 PM  To: Marietta Mcmanus, Reaching out about pt Glenis Staples, to see if you ángela were able to set her up for an appointment, I spoke with patient and she stated she didn't have good service and would like you ángela to call her today while her phone is working.             Thanks, Fred.

## 2019-01-08 NOTE — TELEPHONE ENCOUNTER
Spoke with pt about missed called with Marietta from Diabetes education,I  informed her she left a message, pt states she will give her a call back.

## 2019-01-10 NOTE — PROGRESS NOTES
Doing well.  Diagnosed with GDM but has been unable to make it to diabetes education.  Information given today and glucometer ordered.  Patient will go today to  supplies.  Discussed importance of compliance and monitoring with patient.  Reviewed risks of GDM during pregnancy and increased risks associated with poor control including but not limited to LGA, shoulder dystocia, CD, still birth.  Patient voiced understanding and all questions answered.  Reports fetal movement.  Denies contractions, leakage of fluid, vaginal bleeding.  All questions answered.  RTC in 2 weeks or sooner if needed.

## 2019-01-21 ENCOUNTER — PROCEDURE VISIT (OUTPATIENT)
Dept: MATERNAL FETAL MEDICINE | Facility: CLINIC | Age: 34
End: 2019-01-21
Attending: STUDENT IN AN ORGANIZED HEALTH CARE EDUCATION/TRAINING PROGRAM
Payer: COMMERCIAL

## 2019-01-21 DIAGNOSIS — Z36.89 ENCOUNTER FOR ULTRASOUND TO CHECK FETAL GROWTH: ICD-10-CM

## 2019-01-21 DIAGNOSIS — O99.210 MATERNAL OBESITY AFFECTING PREGNANCY, ANTEPARTUM: ICD-10-CM

## 2019-01-21 PROCEDURE — 76816 PR  US,PREGNANT UTERUS,F/U,TRANSABD APP: ICD-10-PCS | Mod: S$GLB,,, | Performed by: OBSTETRICS & GYNECOLOGY

## 2019-01-21 PROCEDURE — 76816 OB US FOLLOW-UP PER FETUS: CPT | Mod: S$GLB,,, | Performed by: OBSTETRICS & GYNECOLOGY

## 2019-01-21 PROCEDURE — 99499 UNLISTED E&M SERVICE: CPT | Mod: S$GLB,,, | Performed by: OBSTETRICS & GYNECOLOGY

## 2019-01-21 PROCEDURE — 99499 NO LOS: ICD-10-PCS | Mod: S$GLB,,, | Performed by: OBSTETRICS & GYNECOLOGY

## 2019-01-21 NOTE — PROGRESS NOTES
Obstetrical ultrasound completed today.  See report in imaging section of [facility].

## 2019-01-24 ENCOUNTER — PATIENT MESSAGE (OUTPATIENT)
Dept: OBSTETRICS AND GYNECOLOGY | Facility: CLINIC | Age: 34
End: 2019-01-24

## 2019-01-24 ENCOUNTER — ROUTINE PRENATAL (OUTPATIENT)
Dept: OBSTETRICS AND GYNECOLOGY | Facility: CLINIC | Age: 34
End: 2019-01-24
Attending: STUDENT IN AN ORGANIZED HEALTH CARE EDUCATION/TRAINING PROGRAM
Payer: COMMERCIAL

## 2019-01-24 VITALS — DIASTOLIC BLOOD PRESSURE: 84 MMHG | BODY MASS INDEX: 41.72 KG/M2 | WEIGHT: 282.5 LBS | SYSTOLIC BLOOD PRESSURE: 118 MMHG

## 2019-01-24 DIAGNOSIS — Z3A.35 35 WEEKS GESTATION OF PREGNANCY: Primary | ICD-10-CM

## 2019-01-24 PROCEDURE — 99999 PR PBB SHADOW E&M-EST. PATIENT-LVL III: ICD-10-PCS | Mod: PBBFAC,,, | Performed by: STUDENT IN AN ORGANIZED HEALTH CARE EDUCATION/TRAINING PROGRAM

## 2019-01-24 PROCEDURE — 99999 PR PBB SHADOW E&M-EST. PATIENT-LVL III: CPT | Mod: PBBFAC,,, | Performed by: STUDENT IN AN ORGANIZED HEALTH CARE EDUCATION/TRAINING PROGRAM

## 2019-01-24 PROCEDURE — 87081 CULTURE SCREEN ONLY: CPT

## 2019-01-24 PROCEDURE — 0502F SUBSEQUENT PRENATAL CARE: CPT | Mod: S$GLB,,, | Performed by: STUDENT IN AN ORGANIZED HEALTH CARE EDUCATION/TRAINING PROGRAM

## 2019-01-24 PROCEDURE — 0502F PR SUBSEQUENT PRENATAL CARE: ICD-10-PCS | Mod: S$GLB,,, | Performed by: STUDENT IN AN ORGANIZED HEALTH CARE EDUCATION/TRAINING PROGRAM

## 2019-01-24 NOTE — TELEPHONE ENCOUNTER
Phone call made to patient to review glucose logs.  No answer.  Voicemail left to return MD phone call.

## 2019-01-25 ENCOUNTER — TELEPHONE (OUTPATIENT)
Dept: OBSTETRICS AND GYNECOLOGY | Facility: CLINIC | Age: 34
End: 2019-01-25

## 2019-01-25 NOTE — PROGRESS NOTES
"Doing well.  No complaints.  Reports fetal movement.  Denies contractions, leakage of fluid, vaginal bleeding.  Did not bring accuchecks but reports blood glucose levels are "good."  She reports fasting levelsin the 80s and 100s.  She reports 2 hour PP in the 110s and 150s.  She will email me glucose logs today.  Risks of GDM again reviewed with patient and all questions answered.  Growth appropriate.  GlBS and consents today.  RTC in 1 week or sooner if needed.     "

## 2019-01-25 NOTE — TELEPHONE ENCOUNTER
Vikki ob pt - pt is 36 weeks and has a dentist appt on Monday because her gums are very swollen. They are requiring a dr's note stating any restrictions since she is pregnant.  Fax # [phone]

## 2019-01-26 LAB — BACTERIA SPEC AEROBE CULT: NORMAL

## 2019-01-29 ENCOUNTER — HOSPITAL ENCOUNTER (OUTPATIENT)
Dept: DIABETES | Facility: OTHER | Age: 34
Discharge: HOME | End: 2019-01-29
Attending: STUDENT IN AN ORGANIZED HEALTH CARE EDUCATION/TRAINING PROGRAM
Payer: COMMERCIAL

## 2019-01-29 ENCOUNTER — PATIENT MESSAGE (OUTPATIENT)
Dept: OBSTETRICS AND GYNECOLOGY | Facility: CLINIC | Age: 34
End: 2019-01-29

## 2019-01-29 ENCOUNTER — TELEPHONE (OUTPATIENT)
Dept: OBSTETRICS AND GYNECOLOGY | Facility: CLINIC | Age: 34
End: 2019-01-29

## 2019-01-29 VITALS — BODY MASS INDEX: 39.35 KG/M2 | WEIGHT: 281.06 LBS | HEIGHT: 71 IN

## 2019-01-29 DIAGNOSIS — O24.410 DIET CONTROLLED GESTATIONAL DIABETES MELLITUS (GDM) IN THIRD TRIMESTER: Primary | ICD-10-CM

## 2019-01-29 PROCEDURE — G0108 DIAB MANAGE TRN  PER INDIV: HCPCS

## 2019-01-29 NOTE — PROGRESS NOTES
Diabetes Education  Author: Elo Cabrera RN  Date: 1/29/2019    Diabetes Care Management Summary  Diabetes Education Record Assessment/Progress: Initial  Current Diabetes Risk Level: Low         Diabetes Type  Diabetes Type : Gestational    Diabetes History  Diabetes Diagnosis: 0-1 year  Current Treatment: Diet  Reviewed Problem List with Patient: Yes    Health Maintenance was reviewed today with patient. Discussed with patient importance of routine eye exams, foot exams/foot care, blood work (i.e.: A1c, microalbumin, and lipid), dental visits, yearly flu vaccine, and pneumonia vaccine as indicated by PCP. Patient verbalized understanding.     Health Maintenance Topics with due status: Not Due       Topic Last Completion Date    Pap Smear with HPV Cotest 08/13/2018    TETANUS VACCINE 12/03/2018     Health Maintenance Due   Topic Date Due    Lipid Panel  1986       Nutrition  Meal Plan 24 Hour Recall - Breakfast: coffee w/ SF caramel   Meal Plan 24 Hour Recall - Lunch: drake - green tea w/ sweet  Meal Plan 24 Hour Recall - Dinner: mini burgers - 3 - water   Meal Plan 24 Hour Recall - Snack: not really snacking     Monitoring   Self Monitoring : SMBG: AM Fasting: generally <95, only a few times higher - 2 hr PP: 100-130   Blood Glucose Logs: No  Do you use a personal continuous glucose monitor?: No  In the last month, how often have you had a low blood sugar reaction?: never    Exercise   Frequency: Never    Current Diabetes Treatment   Current Treatment: Diet    Social History  Preferred Learning Method: Hands On  Primary Support: Self  Educational Level: College Graduate  Occupation: was working on [employer], 18 hr days - [employer]   Smoking Status: Ex Smoker  Alcohol Use: Never                                Barriers to Change  Barriers to Change: None  Learning Challenges : None    Readiness to Learn   Readiness to Learn : Acceptance    Cultural Influences  Cultural Influences: No    Diabetes Education  Assessment/Progress  Diabetes Disease Process (diabetes disease process and treatment options): Discussion, Requires Assistance Family/SO, Individual Session, Written Materials Provided(Ed on what GDM is and how it evolves throughout pregnancy, ed on importance of using diet, exercise and lifestyle changes to control BG during pregnancy)  Nutrition (Incorporating nutritional management into one's lifestyle): Discussion, Individual Session, Requires Assistance Family/SO, Written Materials Provided(Ed on the plate method and CHO restriction/portioning - ed on choosing healthy CHO, ed on increasing veggies, choosing lean protein/healthy fats and eliminating sweet drinks - label reading exercise completed)  Physical Activity (incorporating physical activity into one's lifestyle): Discussion, Individual Session, Written Materials Provided, No Knowledge(Ed on ADA recs for physical activity and safety considerations during pregnancy)  Medications (states correct name, dose, onset, peak, duration, side effects & timing of meds): Discussion, No Knowledge, Written Materials Provided, Individual Session(Ed on meds used to control BG during pregnancy)  Monitoring (monitoring blood glucose/other parameters & using results): Discussion, Individual Session, Comprehends Key Points, Written Materials Provided(Ed on SMBG schedule, ed on BG goals and ed on trending results and when to call the DrPastor w/ elevating trends)  Acute Complications (preventing, detecting, and treating acute complications): Discussion, Individual Session, No Knowledge, Written Materials Provided(Ed on s/s of low BG and how to prevent and treat during pregnancy - ed on GDM related complications and when to seek medical attention)  Chronic Complications (preventing, detecting, and treating chronic complications): Discussion, Individual Session, No Knowledge, Written Materials Provided(Ed on long term complications r/t GDM, especially increased risk of developing  Type II DM later on in life, stressed importance of getting screened annually w/ PCP)  Clinical (diabetes, other pertinent medical history, and relevant comorbidities reviewed during visit): Discussion, Individual Session, No Knowledge, Written Materials Provided  Cognitive (knowledge of self-management skills, functional health literacy): Discussion, No Knowledge, Individual Session, Written Materials Provided  Psychosocial (emotional response to diabetes): Discussion, Individual Session, No Knowledge, Written Materials Provided  Diabetes Distress and Support Systems: Discussion, Individual Session, No Knowledge, Written Materials Provided  Behavioral (readiness for change, lifestyle practices, self-care behaviors): Discussion, Individual Session, No Knowledge, Written Materials Provided    Goals  Patient has selected/evaluated goals during today's session: Yes, selected  Healthy Eating: Set(pt will restrict cho's to 30 grams per meal)  Start Date: 01/29/19  Monitoring: Set(pt will SMBG 4x/day and bring logs to all future appointments)  Start Date: 01/29/19         Diabetes Care Plan/Intervention  Education Plan/Intervention: Individual Follow-Up DSMT    Diabetes Meal Plan  Restrictions: Restricted Carbohydrate  Carbohydrate Per Meal: 30-45g    Today's Self-Management Care Plan was developed with the patient's input and is based on barriers identified during today's assessment.    The long and short-term goals in the care plan were written with the patient/caregiver's input. The patient has agreed to work toward these goals to improve her overall diabetes control.      The patient received a copy of today's self-management plan and verbalized understanding of the care plan, goals, and all of today's instructions.      The patient was encouraged to communicate with her physician and care team regarding her condition(s) and treatment.  I provided the patient with my contact information today and encouraged her to  contact me via phone or patient portal as needed.     Education Units of Time   Time Spent: 60 min

## 2019-01-29 NOTE — TELEPHONE ENCOUNTER
Phone call again made to patient to review accuchecks.  No answer.  Voicemail left to return MD phone call.  Will send portal message.

## 2019-01-31 ENCOUNTER — ROUTINE PRENATAL (OUTPATIENT)
Dept: OBSTETRICS AND GYNECOLOGY | Facility: CLINIC | Age: 34
End: 2019-01-31
Attending: STUDENT IN AN ORGANIZED HEALTH CARE EDUCATION/TRAINING PROGRAM
Payer: COMMERCIAL

## 2019-01-31 VITALS — DIASTOLIC BLOOD PRESSURE: 80 MMHG | WEIGHT: 283.94 LBS | BODY MASS INDEX: 39.6 KG/M2 | SYSTOLIC BLOOD PRESSURE: 122 MMHG

## 2019-01-31 DIAGNOSIS — O24.410 DIET CONTROLLED GESTATIONAL DIABETES MELLITUS (GDM) IN THIRD TRIMESTER: Primary | ICD-10-CM

## 2019-01-31 DIAGNOSIS — Z3A.36 36 WEEKS GESTATION OF PREGNANCY: Primary | ICD-10-CM

## 2019-01-31 PROCEDURE — 0502F PR SUBSEQUENT PRENATAL CARE: ICD-10-PCS | Mod: S$GLB,,, | Performed by: STUDENT IN AN ORGANIZED HEALTH CARE EDUCATION/TRAINING PROGRAM

## 2019-01-31 PROCEDURE — 99999 PR PBB SHADOW E&M-EST. PATIENT-LVL III: ICD-10-PCS | Mod: PBBFAC,,, | Performed by: STUDENT IN AN ORGANIZED HEALTH CARE EDUCATION/TRAINING PROGRAM

## 2019-01-31 PROCEDURE — 87086 URINE CULTURE/COLONY COUNT: CPT

## 2019-01-31 PROCEDURE — 99999 PR PBB SHADOW E&M-EST. PATIENT-LVL III: CPT | Mod: PBBFAC,,, | Performed by: STUDENT IN AN ORGANIZED HEALTH CARE EDUCATION/TRAINING PROGRAM

## 2019-01-31 PROCEDURE — 0502F SUBSEQUENT PRENATAL CARE: CPT | Mod: S$GLB,,, | Performed by: STUDENT IN AN ORGANIZED HEALTH CARE EDUCATION/TRAINING PROGRAM

## 2019-01-31 RX ORDER — METFORMIN HYDROCHLORIDE 500 MG/1
500 TABLET ORAL
Qty: 30 TABLET | Refills: 3 | Status: ON HOLD | OUTPATIENT
Start: 2019-01-31 | End: 2019-02-19 | Stop reason: HOSPADM

## 2019-01-31 NOTE — PROGRESS NOTES
Doing well.  No complaints.  Reports fetal movement.  Denies contractions, leakage of fluid, vaginal bleeding.  Diagnosed with GDM - last record reviewed and 8/11 fasting elevated.  She has not checked her glucose this week.  Met with Dietary.  Discussed importance of compliance and treatment.  Maternal and fetal risks again reviewed with patient.  She voiced understanding.  She will start checking accuchecks again today.  Declines insulin.  Will start Metformin.  Also discussed antepartum testing and delivery at 39 weeks.  R/B/A reviewed and all questions answered.  SVE C/T/H.  RTC in 1 week or sooner if needed.

## 2019-02-01 ENCOUNTER — PROCEDURE VISIT (OUTPATIENT)
Dept: OBSTETRICS AND GYNECOLOGY | Facility: CLINIC | Age: 34
End: 2019-02-01
Attending: STUDENT IN AN ORGANIZED HEALTH CARE EDUCATION/TRAINING PROGRAM
Payer: COMMERCIAL

## 2019-02-01 DIAGNOSIS — Z3A.36 36 WEEKS GESTATION OF PREGNANCY: ICD-10-CM

## 2019-02-01 DIAGNOSIS — O99.213 OBESITY AFFECTING PREGNANCY IN THIRD TRIMESTER: ICD-10-CM

## 2019-02-01 LAB
BACTERIA UR CULT: NORMAL
BACTERIA UR CULT: NORMAL

## 2019-02-01 PROCEDURE — 76819 FETAL BIOPHYS PROFIL W/O NST: CPT | Mod: S$GLB,,, | Performed by: OBSTETRICS & GYNECOLOGY

## 2019-02-01 PROCEDURE — 76819 PR US, OB, FETAL BIOPHYSICAL, W/O NST: ICD-10-PCS | Mod: S$GLB,,, | Performed by: OBSTETRICS & GYNECOLOGY

## 2019-02-07 ENCOUNTER — ROUTINE PRENATAL (OUTPATIENT)
Dept: OBSTETRICS AND GYNECOLOGY | Facility: CLINIC | Age: 34
End: 2019-02-07
Attending: STUDENT IN AN ORGANIZED HEALTH CARE EDUCATION/TRAINING PROGRAM
Payer: COMMERCIAL

## 2019-02-07 VITALS
BODY MASS INDEX: 39.62 KG/M2 | WEIGHT: 284.06 LBS | DIASTOLIC BLOOD PRESSURE: 76 MMHG | SYSTOLIC BLOOD PRESSURE: 122 MMHG

## 2019-02-07 DIAGNOSIS — Z3A.37 37 WEEKS GESTATION OF PREGNANCY: Primary | ICD-10-CM

## 2019-02-07 PROCEDURE — 0502F PR SUBSEQUENT PRENATAL CARE: ICD-10-PCS | Mod: CPTII,S$GLB,, | Performed by: STUDENT IN AN ORGANIZED HEALTH CARE EDUCATION/TRAINING PROGRAM

## 2019-02-07 PROCEDURE — 0502F SUBSEQUENT PRENATAL CARE: CPT | Mod: CPTII,S$GLB,, | Performed by: STUDENT IN AN ORGANIZED HEALTH CARE EDUCATION/TRAINING PROGRAM

## 2019-02-07 PROCEDURE — 99999 PR PBB SHADOW E&M-EST. PATIENT-LVL II: ICD-10-PCS | Mod: PBBFAC,,, | Performed by: STUDENT IN AN ORGANIZED HEALTH CARE EDUCATION/TRAINING PROGRAM

## 2019-02-07 PROCEDURE — 99999 PR PBB SHADOW E&M-EST. PATIENT-LVL II: CPT | Mod: PBBFAC,,, | Performed by: STUDENT IN AN ORGANIZED HEALTH CARE EDUCATION/TRAINING PROGRAM

## 2019-02-07 NOTE — PROGRESS NOTES
Obstetrical ultrasound completed today.  See report in imaging section of [facility].  
[FreeTextEntry1] : Ms. Littlejohn is a 71 y/o woman with right-sided acoustic neuroma initially diagnosed in 1/2016 at which time she presented with right sided sensorineural hearing loss. MRI 1/26/16 showed findings suggestive of a right vestibular schwannoma. She was treated with a short course of steroids and recommended surveillance imaging. When she presented, an MRI from 5/30/17 had shown slight increase in size of the right vestibular schwannoma.  She received Gamma Knife SRS 12Gy/1fx completed 12/20/18.  Her neurosurgeon is Dr. Kapadia.  She presents for post-treatment evaluation.\par \par Today she notes she has lost most of her hearing on the right side after her gamma knife treatment. This does not bother her much.  \par She denies vertigo, tinnitus, headaches, or difficulty with vision.  Overall is very pleased, doing well.  
[FreeTextEntry1] : Ms. Littlejohn is a 73 y/o woman with right-sided acoustic neuroma initially diagnosed in 1/2016 at which time she presented with right sided sensorineural hearing loss. MRI 1/26/16 showed findings suggestive of a right vestibular schwannoma. She was treated with a short course of steroids and recommended surveillance imaging. When she presented, an MRI from 5/30/17 had shown slight increase in size of the right vestibular schwannoma.  She received Gamma Knife SRS 12Gy/1fx completed 12/20/18.  Her neurosurgeon is Dr. Kapadia.  She presents for post-treatment evaluation.\par \par Today she notes she has lost most of her hearing on the right side after her gamma knife treatment. This does not bother her much.  \par She denies vertigo, tinnitus, headaches, or difficulty with vision.  Overall is very pleased, doing well.

## 2019-02-08 ENCOUNTER — TELEPHONE (OUTPATIENT)
Dept: OBSTETRICS AND GYNECOLOGY | Facility: CLINIC | Age: 34
End: 2019-02-08

## 2019-02-08 ENCOUNTER — PATIENT MESSAGE (OUTPATIENT)
Dept: OBSTETRICS AND GYNECOLOGY | Facility: CLINIC | Age: 34
End: 2019-02-08

## 2019-02-08 NOTE — TELEPHONE ENCOUNTER
----- Message from Mindy Pascal MD sent at 2/8/2019  9:26 AM CST -----  Ok let's snag the 0530 spot and i'll talk with the patient about it.  Thanks so much!  ----- Message -----  From: Adrian Nolasco RN  Sent: 2/8/2019   8:50 AM  To: Mindy Pascal MD    2/15 has 0530,0600, 0630 or 2000, 2015, 2030  ----- Message -----  From: Mindy Pascal MD  Sent: 2/8/2019   8:47 AM  To: Adrian Nolasco RN    Anything on 2/15?  ----- Message -----  From: Adrian Nolasco RN  Sent: 2/8/2019   8:12 AM  To: Mindy Pascal MD    The whole week is full as of now. The only availability is Sunday 2/17 at 0400, 0400, or 0430. The next date is Friday 2/22 at 2030  ----- Message -----  From: Mindy Pascal MD  Sent: 2/7/2019   8:13 PM  To: Adrian Nolasco RN    Please schedule patient for IOL on 2/18 if possible.  Cervix is 0/20/-3.  She is a gestational diabetic on oral medications.  She will be 39w3d.  I will call patient once we find a date.  Thanks so much!

## 2019-02-08 NOTE — PROGRESS NOTES
Doing well.  No complaints.  Reports fetal movement.  Denies contractions, leakage of fluid, vaginal bleeding.  Started Metformin 3 days ago.  Accuchecks incomplete.  2/4 fasting elevated.  2/6 PP elevated.  Also underwent diabetes education but having trouble with diet (ate fruit loops for breakfast).  Again reviewed risks to fetal and maternal health and delivery complications.  Patient voiced understanding.  She will email full logs tomorrow.  BPP 8/8 today.  All questions answered.  RTC in 1 week or sooner if needed.

## 2019-02-08 NOTE — TELEPHONE ENCOUNTER
[facility] scheduled for cytotec induction on 2/15 at 5:30 AM.     Dr. Pascal to discuss date and time with pt

## 2019-02-13 ENCOUNTER — TELEPHONE (OUTPATIENT)
Dept: OBSTETRICS AND GYNECOLOGY | Facility: CLINIC | Age: 34
End: 2019-02-13

## 2019-02-13 NOTE — TELEPHONE ENCOUNTER
----- Message from Mindy Pascal MD sent at 2/12/2019  4:50 PM CST -----  I know we moved her to 2/18/19 in AM.  I went ahead and told the patient.  Do you mind calling her to confirm the time?  Thanks so much!

## 2019-02-13 NOTE — TELEPHONE ENCOUNTER
Pt notified that induction is scheduled for 06:30am on 2/18/19. Verbalized understanding.     Joce. Her induction has changed from 2/15 at 05:30 to 2/18 at 06:30

## 2019-02-14 ENCOUNTER — ROUTINE PRENATAL (OUTPATIENT)
Dept: OBSTETRICS AND GYNECOLOGY | Facility: CLINIC | Age: 34
End: 2019-02-14
Attending: STUDENT IN AN ORGANIZED HEALTH CARE EDUCATION/TRAINING PROGRAM
Payer: COMMERCIAL

## 2019-02-14 VITALS
WEIGHT: 288.81 LBS | DIASTOLIC BLOOD PRESSURE: 76 MMHG | BODY MASS INDEX: 40.28 KG/M2 | SYSTOLIC BLOOD PRESSURE: 118 MMHG

## 2019-02-14 DIAGNOSIS — O24.419 GDM, CLASS A2: ICD-10-CM

## 2019-02-14 DIAGNOSIS — Z3A.37 37 WEEKS GESTATION OF PREGNANCY: ICD-10-CM

## 2019-02-14 DIAGNOSIS — Z3A.38 38 WEEKS GESTATION OF PREGNANCY: Primary | ICD-10-CM

## 2019-02-14 DIAGNOSIS — O24.419 GESTATIONAL DIABETES MELLITUS (GDM) IN THIRD TRIMESTER, GESTATIONAL DIABETES METHOD OF CONTROL UNSPECIFIED: ICD-10-CM

## 2019-02-14 PROCEDURE — 76819 PR US, OB, FETAL BIOPHYSICAL, W/O NST: ICD-10-PCS | Mod: S$GLB,,, | Performed by: OBSTETRICS & GYNECOLOGY

## 2019-02-14 PROCEDURE — 99999 PR PBB SHADOW E&M-EST. PATIENT-LVL II: CPT | Mod: PBBFAC,,, | Performed by: STUDENT IN AN ORGANIZED HEALTH CARE EDUCATION/TRAINING PROGRAM

## 2019-02-14 PROCEDURE — 0502F PR SUBSEQUENT PRENATAL CARE: ICD-10-PCS | Mod: CPTII,S$GLB,, | Performed by: STUDENT IN AN ORGANIZED HEALTH CARE EDUCATION/TRAINING PROGRAM

## 2019-02-14 PROCEDURE — 99999 PR PBB SHADOW E&M-EST. PATIENT-LVL II: ICD-10-PCS | Mod: PBBFAC,,, | Performed by: STUDENT IN AN ORGANIZED HEALTH CARE EDUCATION/TRAINING PROGRAM

## 2019-02-14 PROCEDURE — 0502F SUBSEQUENT PRENATAL CARE: CPT | Mod: CPTII,S$GLB,, | Performed by: STUDENT IN AN ORGANIZED HEALTH CARE EDUCATION/TRAINING PROGRAM

## 2019-02-14 PROCEDURE — 76819 FETAL BIOPHYS PROFIL W/O NST: CPT | Mod: S$GLB,,, | Performed by: OBSTETRICS & GYNECOLOGY

## 2019-02-14 NOTE — PROGRESS NOTES
Doing well.  No complaints.  Reports fetal movement.  Denies contractions, leakage of fluid, vaginal bleeding.  BPP 8/8.  Did not bring accuchecks again but will email them.  Reports fasting in 80s and 2 hour PPs in 110s.  SVE 1/T/H.  All questions answered.  IOL scheduled.  ED precautions reviewed.

## 2019-02-16 ENCOUNTER — PATIENT MESSAGE (OUTPATIENT)
Dept: OBSTETRICS AND GYNECOLOGY | Facility: CLINIC | Age: 34
End: 2019-02-16

## 2019-02-17 RX ORDER — MISOPROSTOL 100 UG/1
600 TABLET ORAL
Status: CANCELLED | OUTPATIENT
Start: 2019-02-17

## 2019-02-17 RX ORDER — SODIUM CHLORIDE 9 MG/ML
INJECTION, SOLUTION INTRAVENOUS
Status: CANCELLED | OUTPATIENT
Start: 2019-02-17

## 2019-02-17 RX ORDER — MISOPROSTOL 100 MCG
25 TABLET ORAL EVERY 4 HOURS
Status: CANCELLED | OUTPATIENT
Start: 2019-02-17 | End: 2019-02-18

## 2019-02-17 RX ORDER — TERBUTALINE SULFATE 1 MG/ML
0.25 INJECTION SUBCUTANEOUS
Status: CANCELLED | OUTPATIENT
Start: 2019-02-17

## 2019-02-17 RX ORDER — OXYTOCIN/RINGER'S LACTATE 20/1000 ML
10 PLASTIC BAG, INJECTION (ML) INTRAVENOUS ONCE
Status: CANCELLED | OUTPATIENT
Start: 2019-02-17 | End: 2019-02-17

## 2019-02-17 RX ORDER — ONDANSETRON 4 MG/1
8 TABLET, ORALLY DISINTEGRATING ORAL EVERY 8 HOURS PRN
Status: CANCELLED | OUTPATIENT
Start: 2019-02-17

## 2019-02-17 RX ORDER — SODIUM CHLORIDE, SODIUM LACTATE, POTASSIUM CHLORIDE, CALCIUM CHLORIDE 600; 310; 30; 20 MG/100ML; MG/100ML; MG/100ML; MG/100ML
INJECTION, SOLUTION INTRAVENOUS CONTINUOUS
Status: CANCELLED | OUTPATIENT
Start: 2019-02-17

## 2019-02-18 ENCOUNTER — ANESTHESIA EVENT (OUTPATIENT)
Dept: OBSTETRICS AND GYNECOLOGY | Facility: OTHER | Age: 34
End: 2019-02-18
Payer: COMMERCIAL

## 2019-02-18 ENCOUNTER — ANESTHESIA (OUTPATIENT)
Dept: OBSTETRICS AND GYNECOLOGY | Facility: OTHER | Age: 34
End: 2019-02-18
Payer: COMMERCIAL

## 2019-02-18 ENCOUNTER — HOSPITAL ENCOUNTER (INPATIENT)
Facility: OTHER | Age: 34
LOS: 2 days | Discharge: HOME | End: 2019-02-20
Attending: STUDENT IN AN ORGANIZED HEALTH CARE EDUCATION/TRAINING PROGRAM | Admitting: STUDENT IN AN ORGANIZED HEALTH CARE EDUCATION/TRAINING PROGRAM
Payer: COMMERCIAL

## 2019-02-18 DIAGNOSIS — Z3A.38 38 WEEKS GESTATION OF PREGNANCY: ICD-10-CM

## 2019-02-18 PROBLEM — Z3A.20 20 WEEKS GESTATION OF PREGNANCY: Status: RESOLVED | Noted: 2018-10-10 | Resolved: 2019-02-18

## 2019-02-18 PROBLEM — O24.419 GESTATIONAL DIABETES MELLITUS (GDM) AFFECTING FIRST PREGNANCY: Status: ACTIVE | Noted: 2019-02-18

## 2019-02-18 PROBLEM — Z3A.28 28 WEEKS GESTATION OF PREGNANCY: Status: RESOLVED | Noted: 2018-12-03 | Resolved: 2019-02-18

## 2019-02-18 LAB
ABO + RH BLD: NORMAL
BASOPHILS # BLD AUTO: 0.01 K/UL
BASOPHILS NFR BLD: 0.1 %
BLD GP AB SCN CELLS X3 SERPL QL: NORMAL
DIFFERENTIAL METHOD: ABNORMAL
EOSINOPHIL # BLD AUTO: 0.1 K/UL
EOSINOPHIL NFR BLD: 0.9 %
ERYTHROCYTE [DISTWIDTH] IN BLOOD BY AUTOMATED COUNT: 13.6 %
HCT VFR BLD AUTO: 33.6 %
HGB BLD-MCNC: 11.7 G/DL
HIV 1+2 AB+HIV1 P24 AG SERPL QL IA: NEGATIVE
HIV1+2 IGG SERPL QL IA.RAPID: NEGATIVE
LYMPHOCYTES # BLD AUTO: 1.8 K/UL
LYMPHOCYTES NFR BLD: 20 %
MCH RBC QN AUTO: 29.3 PG
MCHC RBC AUTO-ENTMCNC: 34.8 G/DL
MCV RBC AUTO: 84 FL
MONOCYTES # BLD AUTO: 0.8 K/UL
MONOCYTES NFR BLD: 8.4 %
NEUTROPHILS # BLD AUTO: 6.4 K/UL
NEUTROPHILS NFR BLD: 70.3 %
PLATELET # BLD AUTO: 274 K/UL
PMV BLD AUTO: 9.5 FL
POCT GLUCOSE: 141 MG/DL (ref 70–110)
RBC # BLD AUTO: 4 M/UL
WBC # BLD AUTO: 9.1 K/UL

## 2019-02-18 PROCEDURE — 27800517 HC TRAY,EPIDURAL-CONTINUOUS: Performed by: ANESTHESIOLOGY

## 2019-02-18 PROCEDURE — 86592 SYPHILIS TEST NON-TREP QUAL: CPT

## 2019-02-18 PROCEDURE — 59200 INSERT CERVICAL DILATOR: CPT | Mod: ,,, | Performed by: OBSTETRICS & GYNECOLOGY

## 2019-02-18 PROCEDURE — 25000003 PHARM REV CODE 250: Performed by: STUDENT IN AN ORGANIZED HEALTH CARE EDUCATION/TRAINING PROGRAM

## 2019-02-18 PROCEDURE — 85025 COMPLETE CBC W/AUTO DIFF WBC: CPT

## 2019-02-18 PROCEDURE — 86850 RBC ANTIBODY SCREEN: CPT

## 2019-02-18 PROCEDURE — 59400 PRA FULL ROUT OBSTE CARE,VAGINAL DELIV: ICD-10-PCS | Mod: QY,,, | Performed by: ANESTHESIOLOGY

## 2019-02-18 PROCEDURE — 51702 INSERT TEMP BLADDER CATH: CPT

## 2019-02-18 PROCEDURE — 59400 OBSTETRICAL CARE: CPT | Mod: ,,, | Performed by: STUDENT IN AN ORGANIZED HEALTH CARE EDUCATION/TRAINING PROGRAM

## 2019-02-18 PROCEDURE — 59400 OBSTETRICAL CARE: CPT | Mod: QY,,, | Performed by: ANESTHESIOLOGY

## 2019-02-18 PROCEDURE — 27200710 HC EPIDURAL INFUSION PUMP SET: Performed by: ANESTHESIOLOGY

## 2019-02-18 PROCEDURE — 72200005 HC VAGINAL DELIVERY LEVEL II

## 2019-02-18 PROCEDURE — 59400 PR FULL ROUT OBSTE CARE,VAGINAL DELIV: ICD-10-PCS | Mod: ,,, | Performed by: STUDENT IN AN ORGANIZED HEALTH CARE EDUCATION/TRAINING PROGRAM

## 2019-02-18 PROCEDURE — 63600175 PHARM REV CODE 636 W HCPCS: Performed by: OBSTETRICS & GYNECOLOGY

## 2019-02-18 PROCEDURE — 25000003 PHARM REV CODE 250: Performed by: OBSTETRICS & GYNECOLOGY

## 2019-02-18 PROCEDURE — 86703 HIV-1/HIV-2 1 RESULT ANTBDY: CPT

## 2019-02-18 PROCEDURE — 59200 PR INSERT CERVICAL DILATOR: ICD-10-PCS | Mod: ,,, | Performed by: OBSTETRICS & GYNECOLOGY

## 2019-02-18 PROCEDURE — 72100002 HC LABOR CARE, 1ST 8 HOURS

## 2019-02-18 PROCEDURE — 62326 NJX INTERLAMINAR LMBR/SAC: CPT | Performed by: ANESTHESIOLOGY

## 2019-02-18 PROCEDURE — 72100003 HC LABOR CARE, EA. ADDL. 8 HRS

## 2019-02-18 PROCEDURE — 11000001 HC ACUTE MED/SURG PRIVATE ROOM

## 2019-02-18 PROCEDURE — 86703 HIV-1/HIV-2 1 RESULT ANTBDY: CPT | Mod: 91

## 2019-02-18 PROCEDURE — 59200 INSERT CERVICAL DILATOR: CPT

## 2019-02-18 RX ORDER — ONDANSETRON 8 MG/1
8 TABLET, ORALLY DISINTEGRATING ORAL EVERY 8 HOURS PRN
Status: DISCONTINUED | OUTPATIENT
Start: 2019-02-18 | End: 2019-02-18

## 2019-02-18 RX ORDER — MISOPROSTOL 100 MCG
25 TABLET ORAL EVERY 4 HOURS
Status: DISCONTINUED | OUTPATIENT
Start: 2019-02-18 | End: 2019-02-18

## 2019-02-18 RX ORDER — DOCUSATE SODIUM 100 MG/1
200 CAPSULE, LIQUID FILLED ORAL 2 TIMES DAILY PRN
Status: DISCONTINUED | OUTPATIENT
Start: 2019-02-18 | End: 2019-02-20 | Stop reason: HOSPADM

## 2019-02-18 RX ORDER — OXYCODONE AND ACETAMINOPHEN 5; 325 MG/1; MG/1
1 TABLET ORAL EVERY 4 HOURS PRN
Status: DISCONTINUED | OUTPATIENT
Start: 2019-02-18 | End: 2019-02-20 | Stop reason: HOSPADM

## 2019-02-18 RX ORDER — OXYTOCIN/RINGER'S LACTATE 20/1000 ML
333 PLASTIC BAG, INJECTION (ML) INTRAVENOUS CONTINUOUS
Status: DISCONTINUED | OUTPATIENT
Start: 2019-02-18 | End: 2019-02-18

## 2019-02-18 RX ORDER — FENTANYL/BUPIVACAINE/NS/PF 2MCG/ML-.1
PLASTIC BAG, INJECTION (ML) INJECTION
Status: DISCONTINUED | OUTPATIENT
Start: 2019-02-18 | End: 2019-02-18

## 2019-02-18 RX ORDER — IBUPROFEN 600 MG/1
600 TABLET ORAL EVERY 6 HOURS PRN
Status: DISCONTINUED | OUTPATIENT
Start: 2019-02-18 | End: 2019-02-20 | Stop reason: HOSPADM

## 2019-02-18 RX ORDER — OXYTOCIN/RINGER'S LACTATE 20/1000 ML
10 PLASTIC BAG, INJECTION (ML) INTRAVENOUS ONCE
Status: DISCONTINUED | OUTPATIENT
Start: 2019-02-18 | End: 2019-02-18

## 2019-02-18 RX ORDER — MISOPROSTOL 200 UG/1
600 TABLET ORAL
Status: DISCONTINUED | OUTPATIENT
Start: 2019-02-18 | End: 2019-02-18

## 2019-02-18 RX ORDER — ACETAMINOPHEN 500 MG
1000 TABLET ORAL EVERY 6 HOURS PRN
Status: DISCONTINUED | OUTPATIENT
Start: 2019-02-18 | End: 2019-02-20 | Stop reason: HOSPADM

## 2019-02-18 RX ORDER — FENTANYL/BUPIVACAINE/NS/PF 2MCG/ML-.1
PLASTIC BAG, INJECTION (ML) INJECTION
Status: COMPLETED
Start: 2019-02-18 | End: 2019-02-18

## 2019-02-18 RX ORDER — OXYTOCIN/RINGER'S LACTATE 20/1000 ML
2 PLASTIC BAG, INJECTION (ML) INTRAVENOUS CONTINUOUS
Status: DISCONTINUED | OUTPATIENT
Start: 2019-02-18 | End: 2019-02-18

## 2019-02-18 RX ORDER — OXYTOCIN/RINGER'S LACTATE 20/1000 ML
41.7 PLASTIC BAG, INJECTION (ML) INTRAVENOUS CONTINUOUS
Status: DISCONTINUED | OUTPATIENT
Start: 2019-02-18 | End: 2019-02-18

## 2019-02-18 RX ORDER — HYDROCORTISONE 25 MG/G
CREAM TOPICAL 3 TIMES DAILY PRN
Status: DISCONTINUED | OUTPATIENT
Start: 2019-02-18 | End: 2019-02-20 | Stop reason: HOSPADM

## 2019-02-18 RX ORDER — MISOPROSTOL 200 UG/1
TABLET ORAL
Status: DISCONTINUED
Start: 2019-02-18 | End: 2019-02-18 | Stop reason: WASHOUT

## 2019-02-18 RX ORDER — FAMOTIDINE 10 MG/ML
20 INJECTION INTRAVENOUS ONCE
Status: DISCONTINUED | OUTPATIENT
Start: 2019-02-18 | End: 2019-02-18

## 2019-02-18 RX ORDER — BUPIVACAINE HYDROCHLORIDE 2.5 MG/ML
INJECTION, SOLUTION EPIDURAL; INFILTRATION; INTRACAUDAL
Status: DISPENSED
Start: 2019-02-18 | End: 2019-02-19

## 2019-02-18 RX ORDER — LIDOCAINE HYDROCHLORIDE AND EPINEPHRINE 15; 5 MG/ML; UG/ML
INJECTION, SOLUTION EPIDURAL
Status: DISCONTINUED | OUTPATIENT
Start: 2019-02-18 | End: 2019-02-18

## 2019-02-18 RX ORDER — METHYLERGONOVINE MALEATE 0.2 MG/ML
INJECTION INTRAVENOUS
Status: DISCONTINUED
Start: 2019-02-18 | End: 2019-02-18 | Stop reason: WASHOUT

## 2019-02-18 RX ORDER — DIPHENHYDRAMINE HCL 25 MG
25 CAPSULE ORAL EVERY 4 HOURS PRN
Status: DISCONTINUED | OUTPATIENT
Start: 2019-02-18 | End: 2019-02-20 | Stop reason: HOSPADM

## 2019-02-18 RX ORDER — SODIUM CHLORIDE 9 MG/ML
INJECTION, SOLUTION INTRAVENOUS
Status: DISCONTINUED | OUTPATIENT
Start: 2019-02-18 | End: 2019-02-18

## 2019-02-18 RX ORDER — TERBUTALINE SULFATE 1 MG/ML
0.25 INJECTION SUBCUTANEOUS
Status: DISCONTINUED | OUTPATIENT
Start: 2019-02-18 | End: 2019-02-18

## 2019-02-18 RX ORDER — CARBOPROST TROMETHAMINE 250 UG/ML
INJECTION, SOLUTION INTRAMUSCULAR
Status: DISCONTINUED
Start: 2019-02-18 | End: 2019-02-18 | Stop reason: WASHOUT

## 2019-02-18 RX ORDER — OXYCODONE AND ACETAMINOPHEN 10; 325 MG/1; MG/1
1 TABLET ORAL EVERY 4 HOURS PRN
Status: DISCONTINUED | OUTPATIENT
Start: 2019-02-18 | End: 2019-02-20 | Stop reason: HOSPADM

## 2019-02-18 RX ORDER — OXYTOCIN 10 [USP'U]/ML
INJECTION, SOLUTION INTRAMUSCULAR; INTRAVENOUS
Status: DISCONTINUED
Start: 2019-02-18 | End: 2019-02-18 | Stop reason: WASHOUT

## 2019-02-18 RX ORDER — ONDANSETRON 8 MG/1
8 TABLET, ORALLY DISINTEGRATING ORAL EVERY 8 HOURS PRN
Status: DISCONTINUED | OUTPATIENT
Start: 2019-02-18 | End: 2019-02-20 | Stop reason: HOSPADM

## 2019-02-18 RX ORDER — FENTANYL/BUPIVACAINE/NS/PF 2MCG/ML-.1
PLASTIC BAG, INJECTION (ML) INJECTION CONTINUOUS
Status: DISCONTINUED | OUTPATIENT
Start: 2019-02-18 | End: 2019-02-18

## 2019-02-18 RX ORDER — SODIUM CITRATE AND CITRIC ACID MONOHYDRATE 334; 500 MG/5ML; MG/5ML
30 SOLUTION ORAL ONCE
Status: DISCONTINUED | OUTPATIENT
Start: 2019-02-18 | End: 2019-02-18

## 2019-02-18 RX ORDER — SODIUM CHLORIDE, SODIUM LACTATE, POTASSIUM CHLORIDE, CALCIUM CHLORIDE 600; 310; 30; 20 MG/100ML; MG/100ML; MG/100ML; MG/100ML
INJECTION, SOLUTION INTRAVENOUS CONTINUOUS
Status: DISCONTINUED | OUTPATIENT
Start: 2019-02-18 | End: 2019-02-18

## 2019-02-18 RX ORDER — FENTANYL/BUPIVACAINE/NS/PF 2MCG/ML-.1
PLASTIC BAG, INJECTION (ML) INJECTION CONTINUOUS PRN
Status: DISCONTINUED | OUTPATIENT
Start: 2019-02-18 | End: 2019-02-18

## 2019-02-18 RX ORDER — OXYTOCIN/RINGER'S LACTATE 20/1000 ML
41.65 PLASTIC BAG, INJECTION (ML) INTRAVENOUS CONTINUOUS
Status: DISCONTINUED | OUTPATIENT
Start: 2019-02-19 | End: 2019-02-19

## 2019-02-18 RX ADMIN — SODIUM CHLORIDE, SODIUM LACTATE, POTASSIUM CHLORIDE, AND CALCIUM CHLORIDE: .6; .31; .03; .02 INJECTION, SOLUTION INTRAVENOUS at 07:02

## 2019-02-18 RX ADMIN — Medication 5 ML: at 10:02

## 2019-02-18 RX ADMIN — ACETAMINOPHEN 1000 MG: 500 TABLET ORAL at 11:02

## 2019-02-18 RX ADMIN — Medication 10 ML/HR: at 10:02

## 2019-02-18 RX ADMIN — Medication 41.65 MILLI-UNITS/MIN: at 10:02

## 2019-02-18 RX ADMIN — LIDOCAINE HYDROCHLORIDE,EPINEPHRINE BITARTRATE 3 ML: 15; .005 INJECTION, SOLUTION EPIDURAL; INFILTRATION; INTRACAUDAL; PERINEURAL at 09:02

## 2019-02-18 RX ADMIN — SODIUM CHLORIDE, SODIUM LACTATE, POTASSIUM CHLORIDE, AND CALCIUM CHLORIDE 1000 ML: .6; .31; .03; .02 INJECTION, SOLUTION INTRAVENOUS at 09:02

## 2019-02-18 RX ADMIN — Medication 2 MILLI-UNITS/MIN: at 07:02

## 2019-02-18 RX ADMIN — ONDANSETRON 8 MG: 8 TABLET, ORALLY DISINTEGRATING ORAL at 01:02

## 2019-02-18 NOTE — H&P
HISTORY AND PHYSICAL                                                OBSTETRICS          Subjective:      Glenis Staples is a 33 y.o.  female with IUP at 39w2d weeks gestation who presents to L&D for induction of labor. Pertinent medical history for this pregnancy includes gestational diabetes mellitus on metformin nightly.  She denies contractions, vaginal bleeding, leakage of fluid.  Reports normal fetal movement. Care this pregnancy has been with Dr. Pascal    PMHx:   Past Medical History:   Diagnosis Date    Acid reflux     HPV in female        PSHx:   Past Surgical History:   Procedure Laterality Date    BREAST SURGERY         All:   Review of patient's allergies indicates:   Allergen Reactions    Hydrocodone-acetaminophen Itching and Nausea And Vomiting       Meds:   (Not in a hospital admission)    SH:   Social History     Socioeconomic History    Marital status: Single     Spouse name: Not on file    Number of children: Not on file    Years of education: Not on file    Highest education level: Not on file   Social Needs    Financial resource strain: Not on file    Food insecurity - worry: Not on file    Food insecurity - inability: Not on file    Transportation needs - medical: Not on file    Transportation needs - non-medical: Not on file   Occupational History    Not on file   Tobacco Use    Smoking status: Never Smoker    Smokeless tobacco: Never Used   Substance and Sexual Activity    Alcohol use: No     Frequency: Never    Drug use: No    Sexual activity: Yes     Partners: Male   Other Topics Concern    Not on file   Social History Narrative    Not on file       FH:   Family History   Problem Relation Age of Onset    Breast cancer Paternal Grandmother     Colon cancer Neg Hx     Ovarian cancer Neg Hx        OBHx:   Obstetric History       T0      L0     SAB0   TAB0   Ectopic0   Multiple0   Live Births0       # Outcome Date GA Lbr Kanu/2nd Weight Sex  Delivery Anes PTL Lv   1 Current                   Objective:      /76   Wt 131 kg (288 lb 12.8 oz)   LMP 2018 (Approximate)   BMI 40.28 kg/m²   Body mass index is 40.28 kg/m².    General:   alert and cooperative   HEENT:  normocephalic, atraumatic   Lungs:   clear to auscultation bilaterally   Heart:   regular rate and rhythm, S1, S2 normal   Abdomen:  gravid, non-tender   Extremities non-tender, no edema   Derm: no rashes or lesions   Psych: appropriate mood and affect   Pelvis:  adequate       SVE 1/T/H  Lab Review  Blood Type O POS  GBBS: negative   Rubella:   Lab Results   Component Value Date    RUBELLAIGGAN 12.7 2018    immune  RPR:   RPR   Date Value Ref Range Status   2018 Non-reactive Non-reactive Final      HIV:   Lab Results   Component Value Date    [id number] Negative 2018     HepB:   Hepatitis B Surface Ag   Date Value Ref Range Status   2018 Negative  Final        Assessment:     33 y.o.  at 39w2d weeks gestation here for induction of labor   Plan:        1. Risks, benefits, alternatives and possible complications have been discussed in detail with the patient. Also reviewed risks associated with gestational diabetes.  All questions have been answered, and Ms. Staples has voiced understanding and agrees to the treatment plan.  2. Consents signed and in chart.  3. Admit to Labor and Delivery unit for induction of labor.

## 2019-02-18 NOTE — PROGRESS NOTES
Labor Progress Note        Subjective:      Patient currently doing well without complaints.     Objective:      Temp:  [98.7 °F (37.1 °C)] 98.7 °F (37.1 °C)  Pulse:  [] 81  Resp:  [20] 20  SpO2:  [92 %] 92 %  BP: (120-129)/(80-88) 129/80  Body mass index is 42.65 kg/m².     General: no acute distress  Electronic Fetal Monitoring:  FHT: 145 bpm, moderate variability, accelerations absent, decelerations absent   Category: 1                 TOCO: Contractions: regular, every 2-4 minutes   Cervix:1/50%/-3 cervical ripening balloon placed with 20 cc above and below as patient had difficulty tolerating   Assessment:     1. IUP at  here for induction of labor     Plan:     1. Continue active management of labor. Pitocin at 6 mU.  2. Reassuring FHT  3. Epidural no.   4. Membranes ruptured no.   5. Recheck in 4 hours or prn.

## 2019-02-18 NOTE — NURSING
Mother encouraged to provide breastmilk for her  baby. Benefits of breastmilk discussed with mother. Mother declines breastfeeding at this time. Mother encouraged to inform nurse if she changes her mind.

## 2019-02-18 NOTE — PROGRESS NOTES
Labor Progress Note        Subjective:      Patient currently doing well without complaints.     Objective:      Temp:  [98.1 °F (36.7 °C)-99.3 °F (37.4 °C)] 99.3 °F (37.4 °C)  Pulse:  [] 78  Resp:  [18-20] 18  SpO2:  [92 %-99 %] 94 %  BP: (104-157)/() 130/76  Body mass index is 42.65 kg/m².     General: no acute distress  Electronic Fetal Monitoring:  FHT: 125 bpm, moderate variability, accelerations present, decelerations absent   Category: 1                 TOCO: Contractions: regular, every 2-3 minutes   Cervix:3/80/-2 AROM clear after balloon removed   Assessment:     1. IUP at  here for induction of labor     Plan:     1. Continue active management of labor. Pitocin at 12 mU.  2. Reassuring FHT  3. Epidural yes.   4. Membranes ruptured yes.   5. Recheck in 4 hours or prn.

## 2019-02-18 NOTE — INTERVAL H&P NOTE
The patient has been examined and the H&P has been reviewed:    I concur with the findings and no changes have occurred since H&P was written.  with frequent contractions, will start pitocin.        Active Hospital Problems    Diagnosis  POA    38 weeks gestation of pregnancy [Z3A.38]  Not Applicable      Resolved Hospital Problems   No resolved problems to display.

## 2019-02-18 NOTE — H&P (VIEW-ONLY)
HISTORY AND PHYSICAL                                                OBSTETRICS          Subjective:      Glenis Staples is a 33 y.o.  female with IUP at 39w2d weeks gestation who presents to L&D for induction of labor. Pertinent medical history for this pregnancy includes gestational diabetes mellitus on metformin nightly.  She denies contractions, vaginal bleeding, leakage of fluid.  Reports normal fetal movement. Care this pregnancy has been with Dr. Pascal    PMHx:   Past Medical History:   Diagnosis Date    Acid reflux     HPV in female        PSHx:   Past Surgical History:   Procedure Laterality Date    BREAST SURGERY         All:   Review of patient's allergies indicates:   Allergen Reactions    Hydrocodone-acetaminophen Itching and Nausea And Vomiting       Meds:   (Not in a hospital admission)    SH:   Social History     Socioeconomic History    Marital status: Single     Spouse name: Not on file    Number of children: Not on file    Years of education: Not on file    Highest education level: Not on file   Social Needs    Financial resource strain: Not on file    Food insecurity - worry: Not on file    Food insecurity - inability: Not on file    Transportation needs - medical: Not on file    Transportation needs - non-medical: Not on file   Occupational History    Not on file   Tobacco Use    Smoking status: Never Smoker    Smokeless tobacco: Never Used   Substance and Sexual Activity    Alcohol use: No     Frequency: Never    Drug use: No    Sexual activity: Yes     Partners: Male   Other Topics Concern    Not on file   Social History Narrative    Not on file       FH:   Family History   Problem Relation Age of Onset    Breast cancer Paternal Grandmother     Colon cancer Neg Hx     Ovarian cancer Neg Hx        OBHx:   Obstetric History       T0      L0     SAB0   TAB0   Ectopic0   Multiple0   Live Births0       # Outcome Date GA Lbr Kanu/2nd Weight Sex  Delivery Anes PTL Lv   1 Current                   Objective:      /76   Wt 131 kg (288 lb 12.8 oz)   LMP 2018 (Approximate)   BMI 40.28 kg/m²   Body mass index is 40.28 kg/m².    General:   alert and cooperative   HEENT:  normocephalic, atraumatic   Lungs:   clear to auscultation bilaterally   Heart:   regular rate and rhythm, S1, S2 normal   Abdomen:  gravid, non-tender   Extremities non-tender, no edema   Derm: no rashes or lesions   Psych: appropriate mood and affect   Pelvis:  adequate       SVE 1/T/H  Lab Review  Blood Type O POS  GBBS: negative   Rubella:   Lab Results   Component Value Date    RUBELLAIGGAN 12.7 2018    immune  RPR:   RPR   Date Value Ref Range Status   2018 Non-reactive Non-reactive Final      HIV:   Lab Results   Component Value Date    [id number] Negative 2018     HepB:   Hepatitis B Surface Ag   Date Value Ref Range Status   2018 Negative  Final        Assessment:     33 y.o.  at 39w2d weeks gestation here for induction of labor   Plan:        1. Risks, benefits, alternatives and possible complications have been discussed in detail with the patient. Also reviewed risks associated with gestational diabetes.  All questions have been answered, and Ms. Staples has voiced understanding and agrees to the treatment plan.  2. Consents signed and in chart.  3. Admit to Labor and Delivery unit for induction of labor.

## 2019-02-18 NOTE — ANESTHESIA PROCEDURE NOTES
Epidural    Patient location during procedure: OB   Reason for block: primary anesthetic   Diagnosis: IUP   Start time: 2/18/2019 9:21 AM  Timeout: 2/18/2019 9:20 AM  End time: 2/18/2019 9:56 AM  Staffing  Anesthesiologist: Janelle Schultz MD  Resident/CRNA: Tanvi Funk MD  Other anesthesia staff: Murray Cespedes MD  Performed: anesthesiologist   Preanesthetic Checklist  Completed: patient identified, site marked, surgical consent, pre-op evaluation, timeout performed, IV checked, risks and benefits discussed, monitors and equipment checked, anesthesia consent given, hand hygiene performed and patient being monitored  Preparation  Patient position: sitting  Prep: ChloraPrep  Epidural  Skin Anesthetic: lidocaine 1%  Skin Wheal: 10 mL  Administration type: continuous  Approach: midline  Interspace: L4-5    Injection technique: OMAR saline  Needle and Epidural Catheter  Needle type: Tuohy   Needle gauge: 17  Needle length: 5.0 inches  Needle insertion depth: 10 cm  Catheter type: end hole and springwound  Catheter size: 18 G  Test dose: 3 mL of lidocaine 1.5% with Epi 1-to-200,000  Additional Documentation: incremental injection, negative aspiration for heme and CSF, no paresthesia on injection, no significant complaints from patient, no signs/symptoms of IV or SA injection and no significant pain on injection  Needle localization: anatomical landmarks  Assessment  Upper dermatomal levels - Left: T6  Right: T6   Dermatomal levels determined by ice  Ease of block: difficult  Patient's tolerance of the procedure: comfortable throughout blockNo inadvertent dural puncture with Tuohy.  Dural puncture performed with spinal needle.

## 2019-02-18 NOTE — PROGRESS NOTES
Labor Progress Note        Subjective:      Patient currently doing well without complaints.     Objective:      Temp:  [98.1 °F (36.7 °C)-99.3 °F (37.4 °C)] 99.3 °F (37.4 °C)  Pulse:  [] 78  Resp:  [18-20] 18  SpO2:  [92 %-99 %] 94 %  BP: (104-157)/() 130/76  Body mass index is 42.65 kg/m².     General: no acute distress  Electronic Fetal Monitoring:  FHT: 130 bpm, moderate variability, accelerations present, decelerations absent   Category: 1                 TOCO: Contractions: regular, every 2-4 minutes   Cervix:5/90/-2   Assessment:     1. IUP at  here for induction of labor     Plan:     1. Continue active management of labor. Pitocin at 12 mU.  2. Reassuring FHT  3. Epidural yes.   4. Membranes ruptured yes.   5. Recheck in 4 hours or prn.

## 2019-02-18 NOTE — ANESTHESIA PREPROCEDURE EVALUATION
02/18/2019  Glenis Staples is a 33 y.o., female.  Past Medical History:   Diagnosis Date    Acid reflux     HPV in female      Patient Active Problem List   Diagnosis    20 weeks gestation of pregnancy    28 weeks gestation of pregnancy    38 weeks gestation of pregnancy   ]  Past Surgical History:   Procedure Laterality Date    BREAST SURGERY       Review of patient's allergies indicates:   Allergen Reactions    Hydrocodone-acetaminophen Itching and Nausea And Vomiting       Anesthesia Evaluation    I have reviewed the Patient Summary Reports.    I have reviewed the Nursing Notes.   I have reviewed the Medications.     Review of Systems  Anesthesia Hx:  No problems with previous Anesthesia    Cardiovascular:   Denies Hypertension.  Denies MI.  Denies CAD.       Pulmonary:   Denies Shortness of breath.  Denies Sleep Apnea.    Hepatic/GI:   GERD, well controlled    Neurological:   Denies TIA. Denies CVA. Denies Seizures.    Endocrine:   Diabetes, gestational Denies Hypothyroidism. Denies Hyperthyroidism.        Physical Exam  General:  Well nourished    Airway/Jaw/Neck:  Airway Findings: Mouth Opening: Normal Tongue: Normal  General Airway Assessment: Adult  Mallampati: III  TM Distance: Normal, at least 6 cm  Jaw/Neck Findings:  Neck ROM: Normal ROM      Dental:  Dental Findings: In tact        Mental Status:  Mental Status Findings:  Cooperative, Alert and Oriented         Anesthesia Plan  Type of Anesthesia, risks & benefits discussed:  Anesthesia Type:  epidural  Patient's Preference:   Intra-op Monitoring Plan: standard ASA monitors  Intra-op Monitoring Plan Comments:   Post Op Pain Control Plan: multimodal analgesia  Post Op Pain Control Plan Comments:   Induction:   IV  Beta Blocker:  Patient is not currently on a Beta-Blocker (No further documentation required).       Informed Consent:  Patient understands risks and agrees with Anesthesia plan.  Questions answered. Anesthesia consent signed with patient.  ASA Score: 3     Day of Surgery Review of History & Physical:    H&P update referred to the surgeon.         Ready For Surgery From Anesthesia Perspective.

## 2019-02-19 PROBLEM — D62 ANEMIA DUE TO BLOOD LOSS, ACUTE: Status: ACTIVE | Noted: 2019-02-19

## 2019-02-19 LAB
BASOPHILS # BLD AUTO: 0.01 K/UL
BASOPHILS NFR BLD: 0.1 %
DIFFERENTIAL METHOD: ABNORMAL
EOSINOPHIL # BLD AUTO: 0 K/UL
EOSINOPHIL NFR BLD: 0.3 %
ERYTHROCYTE [DISTWIDTH] IN BLOOD BY AUTOMATED COUNT: 13.9 %
HCT VFR BLD AUTO: 29.4 %
HGB BLD-MCNC: 9.9 G/DL
LYMPHOCYTES # BLD AUTO: 2 K/UL
LYMPHOCYTES NFR BLD: 13.2 %
MCH RBC QN AUTO: 28.5 PG
MCHC RBC AUTO-ENTMCNC: 33.7 G/DL
MCV RBC AUTO: 85 FL
MONOCYTES # BLD AUTO: 1.4 K/UL
MONOCYTES NFR BLD: 9.2 %
NEUTROPHILS # BLD AUTO: 11.4 K/UL
NEUTROPHILS NFR BLD: 76.8 %
PLATELET # BLD AUTO: 260 K/UL
PMV BLD AUTO: 9.7 FL
POCT GLUCOSE: 72 MG/DL (ref 70–110)
RBC # BLD AUTO: 3.47 M/UL
RPR SER QL: NORMAL
WBC # BLD AUTO: 14.86 K/UL

## 2019-02-19 PROCEDURE — 63600175 PHARM REV CODE 636 W HCPCS: Performed by: OBSTETRICS & GYNECOLOGY

## 2019-02-19 PROCEDURE — 99024 POSTOP FOLLOW-UP VISIT: CPT | Mod: ,,, | Performed by: OBSTETRICS & GYNECOLOGY

## 2019-02-19 PROCEDURE — 36415 COLL VENOUS BLD VENIPUNCTURE: CPT

## 2019-02-19 PROCEDURE — 85025 COMPLETE CBC W/AUTO DIFF WBC: CPT

## 2019-02-19 PROCEDURE — 25000003 PHARM REV CODE 250: Performed by: OBSTETRICS & GYNECOLOGY

## 2019-02-19 PROCEDURE — 11000001 HC ACUTE MED/SURG PRIVATE ROOM

## 2019-02-19 PROCEDURE — 99024 PR POST-OP FOLLOW-UP VISIT: ICD-10-PCS | Mod: ,,, | Performed by: OBSTETRICS & GYNECOLOGY

## 2019-02-19 RX ORDER — OXYCODONE AND ACETAMINOPHEN 5; 325 MG/1; MG/1
1 TABLET ORAL EVERY 4 HOURS PRN
Qty: 10 TABLET | Refills: 0 | Status: SHIPPED | OUTPATIENT
Start: 2019-02-19 | End: 2019-04-02

## 2019-02-19 RX ORDER — IBUPROFEN 600 MG/1
600 TABLET ORAL EVERY 6 HOURS PRN
Qty: 60 TABLET | Refills: 0 | Status: SHIPPED | OUTPATIENT
Start: 2019-02-19 | End: 2019-05-28

## 2019-02-19 RX ADMIN — IBUPROFEN 600 MG: 600 TABLET ORAL at 01:02

## 2019-02-19 RX ADMIN — IBUPROFEN 600 MG: 600 TABLET ORAL at 07:02

## 2019-02-19 RX ADMIN — DOCUSATE SODIUM 200 MG: 100 CAPSULE, LIQUID FILLED ORAL at 01:02

## 2019-02-19 NOTE — L&D DELIVERY NOTE
[facility]  Vaginal Delivery   Operative Note    SUMMARY       Patient 10/100/0.  Pushed with descent of fetal head.  Head delivered over intact perineum.  No nuchal cord.  Shoulder dystocia of R shoulder noted lasting approximately 6 seconds.  Relieved with Melva and Suprapubic maneuvers.  Infant taken to warmer for examination and stimulation.  Apgars 6,9.      Spontaneous delivery of placenta and IV pitocin given noting good uterine tone.  2nd degree laceration noted and repaired in normal fashion with 2-0 vicryl suture.    Patient tolerated delivery well. Sponge needle and lap counted correctly x2.    Discussed with patient and family and all questions answered.     Indications: <principal problem not specified>  Pregnancy complicated by:   Patient Active Problem List   Diagnosis    20 weeks gestation of pregnancy    28 weeks gestation of pregnancy    38 weeks gestation of pregnancy     Admitting GA: 39w3d    Delivery Information for  Isael Staples    Birth information:  YOB: 2018   Time of birth: 9:54 PM   Sex: female   Head Delivery Date/Time: 2/18/2019  9:54 PM   Delivery type: Vaginal, Spontaneous   Gestational Age: 39w3d    Delivery Providers    Delivering clinician:  Mindy Pascal MD   Provider Role    Salma Godoy, RN Delivery Nurse    Adrian Villalta, ASHER Charge Nurse            Measurements    Weight:  3880 g  Length:           Apgars    Living status:  Living  Apgars:   1 min.:   5 min.:   10 min.:   15 min.:   20 min.:     Skin color:   0  1       Heart rate:   2  2       Reflex irritability:   2  2       Muscle tone:   1  2       Respiratory effort:   1  2       Total:   6  9       Apgars assigned by:  PATRICIA JENKINS RN         Operative Delivery    Forceps attempted?:  No  Vacuum extractor attempted?:  No         Shoulder Dystocia    Shoulder dystocia present?:  Yes  Anterior shoulder:  left  Time recognized:  2/18/2019 21:54:00  Time help  called:  2019 21:54:00   Help called by:  Dr. Pascal    Gentle attempt at traction, assisted by maternal expulsive forces?:  Yes   First maneuver:  Melva maneuver  Time performed:  2019 21:54:00  Performed by:  LOLLY Godoy RN and BRENDA Villalta RN           Presentation    Presentation:  Vertex  Position:  Right Occiput Anterior           Interventions/Resuscitation         Cord    No data filed        Placenta    Placenta delivery date/time:    Placenta removal:             Labor Events:       labor: No     Labor Onset Date/Time:         Dilation Complete Date/Time:         Start Pushing Date/Time: 2019 21:20     Rupture Date/Time:              Rupture type:           Fluid Amount:        Fluid Color:        Fluid Odor:        Membrane Status (PeriCalm): ARM (Artificial Rupture)      Rupture Date/Time (PeriCalm): 2019 13:38:00      Fluid Amount (PeriCalm): Moderate      Fluid Color (PeriCalm): Clear       steroids:       Antibiotics given for GBS:       Induction: oxytocin     Indications for induction:        Augmentation: amniotomy     Indications for augmentation: Ineffective Contraction Pattern     Labor complications: None     Additional complications:          Cervical ripening:                     Delivery:      Episiotomy: None     Indication for Episiotomy:       Perineal Lacerations: 2nd Repaired:  Yes   Periurethral Laceration: none Repaired:     Labial Laceration: none Repaired:     Sulcus Laceration: none Repaired:     Vaginal Laceration: No Repaired:     Cervical Laceration: No Repaired:     Repair suture:       Repair # of packets: 1     Vaginal delivery QBL (mL):        QBL (mL): 0     Combined Blood Loss (mL): 0     Vaginal Sweep Performed:       Surgicount Correct:         Other providers:       Anesthesia    Method:  Epidural          Details (if applicable):  Trial of Labor      Categorization:      Priority:      Indications for :     Incision Type:       Additional  information:  Forceps:    Vacuum:    Breech:    Observed anomalies    Other (Comments):

## 2019-02-19 NOTE — SUBJECTIVE & OBJECTIVE
Hospital course: HD1; patient had cervical ripening and labor induction; she delivered viable female infant (3880 g) with a mild shoulder dystocia vaginally on 2/18/19 at ~ 2200.  PPD1: patient feeling well; she is NOT breast feeding by choice.     Interval History: PPD1    She is doing well this morning. She is tolerating a regular diet without nausea or vomiting. She is voiding spontaneously. She is ambulating. She has passed flatus, and has not a BM. Vaginal bleeding is moderate. She denies fever or chills. Abdominal pain is mild and controlled with oral medications. She is NOT breastfeeding.     Objective:     Vital Signs (Most Recent):  Temp: 98.2 °F (36.8 °C) (02/19/19 0700)  Pulse: 104 (02/19/19 0700)  Resp: 18 (02/19/19 0700)  BP: 138/84 (02/19/19 0700)  SpO2: 96 % (02/19/19 0700) Vital Signs (24h Range):  Temp:  [98.1 °F (36.7 °C)-99.3 °F (37.4 °C)] 98.2 °F (36.8 °C)  Pulse:  [] 104  Resp:  [16-99] 18  SpO2:  [91 %-100 %] 96 %  BP: (104-141)/() 138/84     Weight: 131 kg (288 lb 12.8 oz)  Body mass index is 42.65 kg/m².      Intake/Output Summary (Last 24 hours) at 2/19/2019 0958  Last data filed at 2/19/2019 0025  Gross per 24 hour   Intake 3927.46 ml   Output 2900 ml   Net 1027.46 ml       Significant Labs:  Lab Results   Component Value Date    GROUPTRH O POS 02/18/2019    HEPBSAG Negative 08/13/2018    STREPBCULT No Group B Streptococcus isolated 01/24/2019     Recent Labs   Lab 02/19/19 0448   HGB 9.9*   HCT 29.4*       CBC:   Recent Labs   Lab 02/19/19 0448   WBC 14.86*   RBC 3.47*   HGB 9.9*   HCT 29.4*      MCV 85   MCH 28.5   MCHC 33.7     I have personallly reviewed all pertinent lab results from the last 24 hours.    Physical Exam:   Constitutional: She is oriented to person, place, and time. She appears well-developed and well-nourished. No distress.    HENT:   Head: Normocephalic and atraumatic.   Nose: Nose normal.    Eyes: Conjunctivae are normal. Right eye exhibits no  discharge. Left eye exhibits no discharge.     Cardiovascular: Normal rate, regular rhythm and intact distal pulses.   Pulse Score: 2+   Pulmonary/Chest: Effort normal and breath sounds normal. No respiratory distress.        Abdominal: Soft. Bowel sounds are normal. There is no tenderness.   Uterus firm, non-tender and below the umbilicus  obese             Musculoskeletal: Moves all extremeties. She exhibits edema. She exhibits no tenderness.       Neurological: She is oriented to person, place, and time.    Skin: Skin is warm and dry. She is not diaphoretic.   Breasts: non-tender, nonengorged      Psychiatric: She has a normal mood and affect. Her behavior is normal. Judgment and thought content normal.

## 2019-02-19 NOTE — ANESTHESIA POSTPROCEDURE EVALUATION
"Anesthesia Post Evaluation    Patient: Glenis Staples    Procedure(s) Performed: * No procedures listed *    Final Anesthesia Type: epidural  Patient location during evaluation: labor & delivery  Patient participation: Yes- Able to Participate  Level of consciousness: awake and alert  Post-procedure vital signs: reviewed and stable  Pain management: adequate  Airway patency: patent  PONV status at discharge: No PONV  Anesthetic complications: no      Cardiovascular status: hemodynamically stable  Respiratory status: unassisted, spontaneous ventilation and room air  Hydration status: euvolemic  Follow-up not needed.        Visit Vitals  /84   Pulse 104   Temp 36.8 °C (98.2 °F) (Oral)   Resp 18   Ht 5' 9" (1.753 m)   Wt 131 kg (288 lb 12.8 oz)   LMP 06/13/2018 (Approximate)   SpO2 96%   Breastfeeding? Unknown   BMI 42.65 kg/m²       Pain/Lo Score: Pain Rating Prior to Med Admin: 3 (2/18/2019 11:17 PM)  Pain Rating Post Med Admin: 0 (2/19/2019 12:20 AM)        "

## 2019-02-19 NOTE — HPI
34 yo  admitted at 39 wga for induction of labor in pregnancy complicated by GDMA2 on Metformin & maternal obesity. Prenatal care with Dr Pascal; Labs: O+/-; RI; GBS negative

## 2019-02-19 NOTE — HOSPITAL COURSE
HD1; patient had cervical ripening and labor induction; she delivered viable female infant (3880 g) with a mild shoulder dystocia vaginally on 2/18/19 at ~ 2200.  PPD1: patient feeling well; she is NOT breast feeding by choice.   PPD2: Patient is without unusual complaints.  She desires discharge.

## 2019-02-19 NOTE — PROGRESS NOTES
[facility]  Obstetrics  Postpartum Progress Note    Patient Name: Glenis Staples  MRN: [id number]  Admission Date: 2/18/2019  Hospital Length of Stay: 1 days  Attending Physician: Mindy Pascal MD  Primary Care Provider: Primary Doctor No    Subjective:     Principal Problem:Vaginal delivery    Hospital course: HD1; patient had cervical ripening and labor induction; she delivered viable female infant (3880 g) with a mild shoulder dystocia vaginally on 2/18/19 at ~ 2200.  PPD1: patient feeling well; she is NOT breast feeding by choice.     Interval History: PPD1    She is doing well this morning. She is tolerating a regular diet without nausea or vomiting. She is voiding spontaneously. She is ambulating. She has passed flatus, and has not a BM. Vaginal bleeding is moderate. She denies fever or chills. Abdominal pain is mild and controlled with oral medications. She is NOT breastfeeding.     Objective:     Vital Signs (Most Recent):  Temp: 98.2 °F (36.8 °C) (02/19/19 0700)  Pulse: 104 (02/19/19 0700)  Resp: 18 (02/19/19 0700)  BP: 138/84 (02/19/19 0700)  SpO2: 96 % (02/19/19 0700) Vital Signs (24h Range):  Temp:  [98.1 °F (36.7 °C)-99.3 °F (37.4 °C)] 98.2 °F (36.8 °C)  Pulse:  [] 104  Resp:  [16-99] 18  SpO2:  [91 %-100 %] 96 %  BP: (104-141)/() 138/84     Weight: 131 kg (288 lb 12.8 oz)  Body mass index is 42.65 kg/m².      Intake/Output Summary (Last 24 hours) at 2/19/2019 0958  Last data filed at 2/19/2019 0025  Gross per 24 hour   Intake 3927.46 ml   Output 2900 ml   Net 1027.46 ml       Significant Labs:  Lab Results   Component Value Date    GROUPTRH O POS 02/18/2019    HEPBSAG Negative 08/13/2018    STREPBCULT No Group B Streptococcus isolated 01/24/2019     Recent Labs   Lab 02/19/19  0448   HGB 9.9*   HCT 29.4*       CBC:   Recent Labs   Lab 02/19/19  0448   WBC 14.86*   RBC 3.47*   HGB 9.9*   HCT 29.4*      MCV 85   MCH 28.5   MCHC 33.7     I have personallly  reviewed all pertinent lab results from the last 24 hours.    Physical Exam:   Constitutional: She is oriented to person, place, and time. She appears well-developed and well-nourished. No distress.    HENT:   Head: Normocephalic and atraumatic.   Nose: Nose normal.    Eyes: Conjunctivae are normal. Right eye exhibits no discharge. Left eye exhibits no discharge.     Cardiovascular: Normal rate, regular rhythm and intact distal pulses.   Pulse Score: 2+   Pulmonary/Chest: Effort normal and breath sounds normal. No respiratory distress.        Abdominal: Soft. Bowel sounds are normal. There is no tenderness.   Uterus firm, non-tender and below the umbilicus  obese             Musculoskeletal: Moves all extremeties. She exhibits edema. She exhibits no tenderness.       Neurological: She is oriented to person, place, and time.    Skin: Skin is warm and dry. She is not diaphoretic.   Breasts: non-tender, nonengorged      Psychiatric: She has a normal mood and affect. Her behavior is normal. Judgment and thought content normal.       Assessment/Plan:     33 y.o. female  for:    * Vaginal delivery    Continue routine post partum care; breast support advised as not breast feeding.     Anemia due to blood loss, acute    showing moderate anemia due to intrapartum blood loss. The patient is asymptomatic of the anemia - no intervention is indicated       Gestational diabetes mellitus (GDM) affecting first pregnancy    Fasting blood glucose.  Plan for repeat testing 6-8 weeks post partum         Disposition: As patient meets milestones, will plan to discharge PPD2.    Tayler Pierson MD  Obstetrics  [facility]

## 2019-02-20 VITALS
OXYGEN SATURATION: 97 % | WEIGHT: 288.81 LBS | TEMPERATURE: 98 F | BODY MASS INDEX: 42.78 KG/M2 | HEIGHT: 69 IN | HEART RATE: 88 BPM | RESPIRATION RATE: 18 BRPM | DIASTOLIC BLOOD PRESSURE: 67 MMHG | SYSTOLIC BLOOD PRESSURE: 115 MMHG

## 2019-02-20 PROCEDURE — 25000003 PHARM REV CODE 250: Performed by: OBSTETRICS & GYNECOLOGY

## 2019-02-20 PROCEDURE — 99024 PR POST-OP FOLLOW-UP VISIT: ICD-10-PCS | Mod: ,,, | Performed by: OBSTETRICS & GYNECOLOGY

## 2019-02-20 PROCEDURE — 99024 POSTOP FOLLOW-UP VISIT: CPT | Mod: ,,, | Performed by: OBSTETRICS & GYNECOLOGY

## 2019-02-20 RX ADMIN — DOCUSATE SODIUM 200 MG: 100 CAPSULE, LIQUID FILLED ORAL at 08:02

## 2019-02-20 RX ADMIN — OXYCODONE HYDROCHLORIDE AND ACETAMINOPHEN 1 TABLET: 10; 325 TABLET ORAL at 12:02

## 2019-02-20 RX ADMIN — IBUPROFEN 600 MG: 600 TABLET ORAL at 12:02

## 2019-02-20 NOTE — DISCHARGE SUMMARY
[facility]  Obstetrics  Discharge Summary      Patient Name: Glenis Staples  MRN: [id number]  Admission Date: 2019  Hospital Length of Stay: 2 days  Discharge Date and Time:  2019 10:24 AM  Attending Physician: Mindy Pascal MD   Discharging Provider: Poly Jackson MD  Primary Care Provider: Primary Doctor No    HPI: 32 yo  admitted at 39 wga for induction of labor in pregnancy complicated by GDMA2 on Metformin & maternal obesity. Prenatal care with Dr Pascal; Labs: O+/-; RI; GBS negative    * No surgery found *     Hospital Course:   HD1; patient had cervical ripening and labor induction; she delivered viable female infant (3880 g) with a mild shoulder dystocia vaginally on 19 at ~ 2200.  PPD1: patient feeling well; she is NOT breast feeding by choice.   PPD2: Patient is without unusual complaints.  She desires discharge.         Final Active Diagnoses:    Diagnosis Date Noted POA    PRINCIPAL PROBLEM:  Vaginal delivery [O80] 2019 Not Applicable    Anemia due to blood loss, acute [D62] 2019 No    Gestational diabetes mellitus (GDM) affecting first pregnancy [O24.419] 2019 Yes    38 weeks gestation of pregnancy [Z3A.38] 2019 Not Applicable      Problems Resolved During this Admission:    Diagnosis Date Noted Date Resolved POA    38 weeks gestation of pregnancy [Z3A.38] 2019 Not Applicable        Labs:   CBC   Recent Labs   Lab 19  0448   WBC 14.86*   HGB 9.9*   HCT 29.4*          Feeding Method: bottle    Immunizations     Date Immunization Status Dose Route/Site Given by    19 1001 MMR Deleted 0.5 mL Subcutaneous/Left deltoid     19 1001 Tdap Deleted 0.5 mL Intramuscular/Left deltoid           Delivery:    Episiotomy: None   Lacerations: 2nd   Repair suture:     Repair # of packets: 1   Blood loss (ml): 400     Birth information:  YOB: 2018   Time of birth: 9:54 PM   Sex:  female   Delivery type: Vaginal, Spontaneous   Gestational Age: 39w3d    Delivery Clinician:      Other providers:       Additional  information:  Forceps:    Vacuum:    Breech:    Observed anomalies      Living?:           APGARS  One minute Five minutes Ten minutes   Skin color:         Heart rate:         Grimace:         Muscle tone:         Breathing:         Totals: 6  9        Placenta: Delivered:       appearance    Pending Diagnostic Studies:     None          Discharged Condition: good    Disposition: Home or Self Care    Follow Up:  Follow-up Information     Mindy Pascal MD In 6 weeks.    Specialty:  Obstetrics and Gynecology  Why:  Post partum exam  Contact information:  [address]  [address]  [place] [address]  [phone]                 Patient Instructions:      Call MD for:  temperature >100.4     Call MD for:  persistent nausea and vomiting or diarrhea     Call MD for:  severe uncontrolled pain     Call MD for:  redness, tenderness, or signs of infection (pain, swelling, redness, odor or green/yellow discharge around incision site)     No dressing needed     Medications:  Current Discharge Medication List      START taking these medications    Details   ibuprofen (ADVIL,MOTRIN) 600 MG tablet Take 1 tablet (600 mg total) by mouth every 6 (six) hours as needed for Pain (cramping).  Qty: 60 tablet, Refills: 0      oxyCODONE-acetaminophen (PERCOCET) 5-325 mg per tablet Take 1 tablet by mouth every 4 (four) hours as needed for Pain.  Qty: 10 tablet, Refills: 0         CONTINUE these medications which have NOT CHANGED    Details   blood-glucose meter Misc 1 Device by Misc.(Non-Drug; Combo Route) route 4 (four) times daily. for 1 day  Qty: 1 each, Refills: 0      PNV/ferrous fum/docusate/folic (PRENATAL VIT-FE FUM-FA-DSS ORAL) Take by mouth.         STOP taking these medications       blood sugar diagnostic Strp Comments:   Reason for Stopping:         lancets Misc Comments:   Reason  for Stopping:         metFORMIN (GLUCOPHAGE) 500 MG tablet Comments:   Reason for Stopping:         ONETOUCH DELICA LANCETS 33 gauge Misc Comments:   Reason for Stopping:               Poly Jackson MD  Obstetrics [facility]

## 2019-02-20 NOTE — PLAN OF CARE
Problem: Adult Inpatient Plan of Care  Goal: Plan of Care Review  Outcome: Outcome(s) achieved Date Met: 02/20/19  Pt ambulating, voiding, and passing flatus. Pt tolerating PO well and SS of distress at this time. Pt's pain well controlled throughout shift by oral pain medication. Pt's bleeding has been light throughout shift and fundus is firm. Pt discharged today per physicians order. Pt verbalized understanding that she must follow up with her OBGYN in 6 weeks. Mother/Baby care guide reviewed on previous shift. Pt expressed understanding. Pt stable at this time.

## 2019-02-20 NOTE — PROGRESS NOTES
[facility]  Obstetrics  Postpartum Progress Note    Patient Name: Glenis Staples  MRN: [id number]  Admission Date: 2/18/2019  Hospital Length of Stay: 2 days  Attending Physician: Mindy Pascal MD  Primary Care Provider: Primary Doctor No    Subjective:     Principal Problem:Vaginal delivery    Hospital course: HD1; patient had cervical ripening and labor induction; she delivered viable female infant (3880 g) with a mild shoulder dystocia vaginally on 2/18/19 at ~ 2200.  PPD1: patient feeling well; she is NOT breast feeding by choice.   PPD2: Patient is without unusual complaints.  She desires discharge.       She is doing well this morning. She is tolerating a regular diet without nausea or vomiting. She is voiding spontaneously. She is ambulating. She has passed flatus, and has not a BM. Vaginal bleeding is mild. She denies fever or chills. Abdominal pain is mild and controlled with oral medications. She is not breastfeeding.     Objective:     Vital Signs (Most Recent):  Temp: 98.4 °F (36.9 °C) (02/20/19 0715)  Pulse: 88 (02/20/19 0715)  Resp: 18 (02/20/19 0715)  BP: 115/67 (02/20/19 0715)  SpO2: 97 % (02/20/19 0715) Vital Signs (24h Range):  Temp:  [97.9 °F (36.6 °C)-98.4 °F (36.9 °C)] 98.4 °F (36.9 °C)  Pulse:  [] 88  Resp:  [18] 18  SpO2:  [96 %-97 %] 97 %  BP: (114-129)/(58-72) 115/67     Weight: 131 kg (288 lb 12.8 oz)  Body mass index is 42.65 kg/m².    No intake or output data in the 24 hours ending 02/20/19 1020    Significant Labs:  Lab Results   Component Value Date    GROUPTRH O POS 02/18/2019    HEPBSAG Negative 08/13/2018    STREPBCULT No Group B Streptococcus isolated 01/24/2019     Recent Labs   Lab 02/19/19  0448   HGB 9.9*   HCT 29.4*       I have personallly reviewed all pertinent lab results from the last 24 hours.    Physical Exam:   Constitutional: She is oriented to person, place, and time. She appears well-developed and well-nourished. No distress.        Cardiovascular: Normal rate.     Pulmonary/Chest: No respiratory distress.        Abdominal: Soft. She exhibits no distension. There is no tenderness. There is no rebound and no guarding.     Genitourinary:   Genitourinary Comments: Fundus firm, NT           Musculoskeletal: She exhibits edema. She exhibits no tenderness.       Neurological: She is alert and oriented to person, place, and time.    Skin: Skin is warm and dry.    Psychiatric: She has a normal mood and affect.       Assessment/Plan:     33 y.o. female  for:    * Vaginal delivery    Continue routine post partum care; breast support advised as not breast feeding.  Will discharge home today.      Anemia due to blood loss, acute    showing moderate anemia due to intrapartum blood loss. The patient is asymptomatic of the anemia - no intervention is indicated       Gestational diabetes mellitus (GDM) affecting first pregnancy    Fasting blood glucose.  Plan for repeat testing 6-8 weeks post partum         Disposition: As patient meets milestones, will plan to discharge today.    Poly Jackson MD  Obstetrics  [facility]

## 2019-02-20 NOTE — SUBJECTIVE & OBJECTIVE
Hospital course: HD1; patient had cervical ripening and labor induction; she delivered viable female infant (3880 g) with a mild shoulder dystocia vaginally on 2/18/19 at ~ 2200.  PPD1: patient feeling well; she is NOT breast feeding by choice.   PPD2: Patient is without unusual complaints.  She desires discharge.       She is doing well this morning. She is tolerating a regular diet without nausea or vomiting. She is voiding spontaneously. She is ambulating. She has passed flatus, and has not a BM. Vaginal bleeding is mild. She denies fever or chills. Abdominal pain is mild and controlled with oral medications. She is not breastfeeding.     Objective:     Vital Signs (Most Recent):  Temp: 98.4 °F (36.9 °C) (02/20/19 0715)  Pulse: 88 (02/20/19 0715)  Resp: 18 (02/20/19 0715)  BP: 115/67 (02/20/19 0715)  SpO2: 97 % (02/20/19 0715) Vital Signs (24h Range):  Temp:  [97.9 °F (36.6 °C)-98.4 °F (36.9 °C)] 98.4 °F (36.9 °C)  Pulse:  [] 88  Resp:  [18] 18  SpO2:  [96 %-97 %] 97 %  BP: (114-129)/(58-72) 115/67     Weight: 131 kg (288 lb 12.8 oz)  Body mass index is 42.65 kg/m².    No intake or output data in the 24 hours ending 02/20/19 1020    Significant Labs:  Lab Results   Component Value Date    GROUPTRH O POS 02/18/2019    HEPBSAG Negative 08/13/2018    STREPBCULT No Group B Streptococcus isolated 01/24/2019     Recent Labs   Lab 02/19/19  0448   HGB 9.9*   HCT 29.4*       I have personallly reviewed all pertinent lab results from the last 24 hours.    Physical Exam:   Constitutional: She is oriented to person, place, and time. She appears well-developed and well-nourished. No distress.       Cardiovascular: Normal rate.     Pulmonary/Chest: No respiratory distress.        Abdominal: Soft. She exhibits no distension. There is no tenderness. There is no rebound and no guarding.     Genitourinary:   Genitourinary Comments: Fundus firm, NT           Musculoskeletal: She exhibits edema. She exhibits no tenderness.        Neurological: She is alert and oriented to person, place, and time.    Skin: Skin is warm and dry.    Psychiatric: She has a normal mood and affect.

## 2019-02-20 NOTE — PROGRESS NOTES
OBGYN Progress Note    Patient doing well s/p vaginal delivery.  Ambulating, voiding, tolerating a regular diet.  Baby with R clavicular noted today.  Discussed with patient and family.  All questions answered.  Will continue to monitor.  Appreciate Peds management and Ortho follow up.

## 2019-02-20 NOTE — ASSESSMENT & PLAN NOTE
Continue routine post partum care; breast support advised as not breast feeding.  Will discharge home today.

## 2019-02-22 ENCOUNTER — NURSE TRIAGE (OUTPATIENT)
Dept: ADMINISTRATIVE | Facility: CLINIC | Age: 34
End: 2019-02-22

## 2019-02-22 NOTE — TELEPHONE ENCOUNTER
"    Reason for Disposition   Passing blood clots and persists > 4 days postpartum    Protocols used: ST POSTPARTUM - VAGINAL BLEEDING AND LOCHIA-A-OH    Glenis said she had vaginal delivery Monday.  Today passed small clots, also one "the size of a golf ball."  She states she felt it pass.  No fever, no abdominal pain, no gordon blood, not using a pad an hour, all other triage questions negative.  Per [facility] triage protocol, message to Dr Pascal, OB; protocol recommendation is for her to be seen today.  Please contact caller directly with any additional care advice.  CALL BACK IF:   * Severe abdominal pain or lightheadedness occurs  * Bleeding worsens or does not improve   * Fever more [id number] F (38.0 C)  * You become worse  "

## 2019-02-26 ENCOUNTER — TELEPHONE (OUTPATIENT)
Dept: OBSTETRICS AND GYNECOLOGY | Facility: OTHER | Age: 34
End: 2019-02-26

## 2019-02-27 ENCOUNTER — TELEPHONE (OUTPATIENT)
Dept: OBSTETRICS AND GYNECOLOGY | Facility: OTHER | Age: 34
End: 2019-02-27

## 2019-02-27 NOTE — TELEPHONE ENCOUNTER
"The patient states she is doing well since being discharged home. She states her pain is "manageable" and decreasing daily. She states the  is formula feeding and gaining weight. She states she has help from her mother who lives close by. The patient knows to contact her provider with any questions/concerns and states she had a great experience at [facility].  "

## 2019-03-27 ENCOUNTER — TELEPHONE (OUTPATIENT)
Dept: OBSTETRICS AND GYNECOLOGY | Facility: CLINIC | Age: 34
End: 2019-03-27

## 2019-03-27 NOTE — TELEPHONE ENCOUNTER
----- Message from Mindy Pascal MD sent at 3/27/2019 10:29 AM CDT -----  Please call patient to schedule postpartum visit.  Thanks so much!

## 2019-04-02 ENCOUNTER — POSTPARTUM VISIT (OUTPATIENT)
Dept: OBSTETRICS AND GYNECOLOGY | Facility: CLINIC | Age: 34
End: 2019-04-02
Attending: STUDENT IN AN ORGANIZED HEALTH CARE EDUCATION/TRAINING PROGRAM
Payer: COMMERCIAL

## 2019-04-02 VITALS
HEIGHT: 69 IN | BODY MASS INDEX: 39.82 KG/M2 | DIASTOLIC BLOOD PRESSURE: 76 MMHG | SYSTOLIC BLOOD PRESSURE: 124 MMHG | WEIGHT: 268.88 LBS

## 2019-04-02 DIAGNOSIS — O24.419 GESTATIONAL DIABETES MELLITUS (GDM), ANTEPARTUM, GESTATIONAL DIABETES METHOD OF CONTROL UNSPECIFIED: ICD-10-CM

## 2019-04-02 DIAGNOSIS — Z30.430 ENCOUNTER FOR INSERTION OF MIRENA IUD: Primary | ICD-10-CM

## 2019-04-02 PROCEDURE — 99999 PR PBB SHADOW E&M-EST. PATIENT-LVL III: CPT | Mod: PBBFAC,,, | Performed by: STUDENT IN AN ORGANIZED HEALTH CARE EDUCATION/TRAINING PROGRAM

## 2019-04-02 PROCEDURE — 99999 PR PBB SHADOW E&M-EST. PATIENT-LVL III: ICD-10-PCS | Mod: PBBFAC,,, | Performed by: STUDENT IN AN ORGANIZED HEALTH CARE EDUCATION/TRAINING PROGRAM

## 2019-04-02 NOTE — PROGRESS NOTES
Subjective:      Glenis Staples is a 34 y.o.  who presents for a postpartum visit.  She is status post  vaginal delivery complicated by shoulder dystocia 6 weeks ago - baby with clavicle fracture.  Pregnancy complicated by gestational diabetes on Metformin.  Baby doing well - clavicle fracture healing well (no longer requiring splint).  Hip dysplasia present and now in brace.  Patient is bottle feeding.  She desires IUD for contraception.  She reports mood is stable.      Her last pap was NEM on 8/15/2018     Past Medical History:   Diagnosis Date    Acid reflux     HPV in female        Current Outpatient Medications:     blood-glucose meter Misc, 1 Device by Misc.(Non-Drug; Combo Route) route 4 (four) times daily. for 1 day, Disp: 1 each, Rfl: 0    ibuprofen (ADVIL,MOTRIN) 600 MG tablet, Take 1 tablet (600 mg total) by mouth every 6 (six) hours as needed for Pain (cramping)., Disp: 60 tablet, Rfl: 0    PNV/ferrous fum/docusate/folic (PRENATAL VIT-FE FUM-FA-DSS ORAL), Take by mouth., Disp: , Rfl:       Objective:     Vitals:    19 1305   BP: 124/76       APPEARANCE: Well nourished, well developed, in no acute distress.  PSYCH: Appropriate mood and affect.  NECK:  Supple, no thyromegaly.  BREASTS:  No masses, no nipple discharge.  CARDIOVASCULAR:  Regular rate and rhythm.  LUNGS:  Clear to auscultation bilaterally.  ABDOMEN:  Soft, nontender.  GENITOURINARY:  External genitalia normal in appearance, normal perineal body, normal urethral meatus.  Vagina with scant discharge, no blood.  No lesions.  Cervix without lesion, C/T/H.  Uterus mobile, nontender, normal in size.  Adnexa without masses or TTP.    EXTREMITIES: No edema.      Assessment:     Encounter for insertion of mirena IUD  -     Device Authorization Order    Gestational diabetes mellitus (GDM), antepartum, gestational diabetes method of control unspecified  -     Glucose Tolerance 2 Hour; Future; Expected date:  04/02/2019        Plan:     1. Postpartum course reviewed and discussed with patient.  All questions answered.  Return to clinic for IUD insertion and 2 hour glucose.

## 2019-04-05 ENCOUNTER — TELEPHONE (OUTPATIENT)
Dept: OBSTETRICS AND GYNECOLOGY | Facility: CLINIC | Age: 34
End: 2019-04-05

## 2019-04-05 DIAGNOSIS — Z30.430 ENCOUNTER FOR INSERTION OF MIRENA IUD: Primary | ICD-10-CM

## 2019-04-16 ENCOUNTER — PROCEDURE VISIT (OUTPATIENT)
Dept: OBSTETRICS AND GYNECOLOGY | Facility: CLINIC | Age: 34
End: 2019-04-16
Attending: STUDENT IN AN ORGANIZED HEALTH CARE EDUCATION/TRAINING PROGRAM
Payer: COMMERCIAL

## 2019-04-16 VITALS
WEIGHT: 270.94 LBS | DIASTOLIC BLOOD PRESSURE: 74 MMHG | BODY MASS INDEX: 40.13 KG/M2 | HEIGHT: 69 IN | SYSTOLIC BLOOD PRESSURE: 112 MMHG

## 2019-04-16 DIAGNOSIS — Z30.9 ENCOUNTER FOR CONTRACEPTIVE MANAGEMENT, UNSPECIFIED TYPE: Primary | ICD-10-CM

## 2019-04-16 PROCEDURE — 58300 INSERT INTRAUTERINE DEVICE: CPT | Mod: S$GLB,,, | Performed by: STUDENT IN AN ORGANIZED HEALTH CARE EDUCATION/TRAINING PROGRAM

## 2019-04-16 PROCEDURE — 58300 INSERTION OF IUD: ICD-10-PCS | Mod: S$GLB,,, | Performed by: STUDENT IN AN ORGANIZED HEALTH CARE EDUCATION/TRAINING PROGRAM

## 2019-04-17 NOTE — PROCEDURES
Insertion of IUD  Date/Time: 4/16/2019 9:54 PM  Performed by: Mindy Pascal MD  Authorized by: Mindy Pascal MD     Consent:     Consent obtained:  Written    Consent given by:  Patient    Procedure risks and benefits discussed: yes      Patient questions answered: yes      Patient agrees, verbalizes understanding, and wants to proceed: yes      Educational handouts given: yes      Instructions and paperwork completed: yes    Procedure:     Pelvic exam performed: yes      Negative GC/chlamydia test: yes      Negative urine pregnancy test: yes      Cervix cleaned and prepped: yes      Speculum placed in vagina: yes      Uterus sounded: yes      Uterus sound depth (cm):  10    IUD inserted with no complications: yes      IUD type:  Mirena    Strings trimmed: yes    Post-procedure:     Patient tolerated procedure well: yes      Patient will follow up after next period: yes    Comments:      IUD position at uterine fundus confirmed with U/S.

## 2019-04-23 ENCOUNTER — PATIENT MESSAGE (OUTPATIENT)
Dept: OBSTETRICS AND GYNECOLOGY | Facility: CLINIC | Age: 34
End: 2019-04-23

## 2019-05-09 ENCOUNTER — PATIENT MESSAGE (OUTPATIENT)
Dept: OBSTETRICS AND GYNECOLOGY | Facility: CLINIC | Age: 34
End: 2019-05-09

## 2019-05-28 ENCOUNTER — OFFICE VISIT (OUTPATIENT)
Dept: OBSTETRICS AND GYNECOLOGY | Facility: CLINIC | Age: 34
End: 2019-05-28
Attending: STUDENT IN AN ORGANIZED HEALTH CARE EDUCATION/TRAINING PROGRAM
Payer: COMMERCIAL

## 2019-05-28 VITALS
SYSTOLIC BLOOD PRESSURE: 118 MMHG | HEIGHT: 69 IN | DIASTOLIC BLOOD PRESSURE: 78 MMHG | BODY MASS INDEX: 40.08 KG/M2 | WEIGHT: 270.63 LBS

## 2019-05-28 DIAGNOSIS — Z30.9 ENCOUNTER FOR CONTRACEPTIVE MANAGEMENT, UNSPECIFIED TYPE: Primary | ICD-10-CM

## 2019-05-28 PROCEDURE — 99213 OFFICE O/P EST LOW 20 MIN: CPT | Mod: S$GLB,,, | Performed by: STUDENT IN AN ORGANIZED HEALTH CARE EDUCATION/TRAINING PROGRAM

## 2019-05-28 PROCEDURE — 99213 PR OFFICE/OUTPT VISIT, EST, LEVL III, 20-29 MIN: ICD-10-PCS | Mod: S$GLB,,, | Performed by: STUDENT IN AN ORGANIZED HEALTH CARE EDUCATION/TRAINING PROGRAM

## 2019-05-28 PROCEDURE — 3008F BODY MASS INDEX DOCD: CPT | Mod: CPTII,S$GLB,, | Performed by: STUDENT IN AN ORGANIZED HEALTH CARE EDUCATION/TRAINING PROGRAM

## 2019-05-28 PROCEDURE — 3008F PR BODY MASS INDEX (BMI) DOCUMENTED: ICD-10-PCS | Mod: CPTII,S$GLB,, | Performed by: STUDENT IN AN ORGANIZED HEALTH CARE EDUCATION/TRAINING PROGRAM

## 2019-05-28 PROCEDURE — 99999 PR PBB SHADOW E&M-EST. PATIENT-LVL III: CPT | Mod: PBBFAC,,, | Performed by: STUDENT IN AN ORGANIZED HEALTH CARE EDUCATION/TRAINING PROGRAM

## 2019-05-28 PROCEDURE — 99999 PR PBB SHADOW E&M-EST. PATIENT-LVL III: ICD-10-PCS | Mod: PBBFAC,,, | Performed by: STUDENT IN AN ORGANIZED HEALTH CARE EDUCATION/TRAINING PROGRAM

## 2019-05-29 PROBLEM — Z30.9 ENCOUNTER FOR CONTRACEPTIVE MANAGEMENT: Status: ACTIVE | Noted: 2019-05-29

## 2019-05-29 NOTE — PROGRESS NOTES
"  Chief Complaint: IUD String Check     HPI:      Glenis Staples 34 y.o.   presents for follow up after IUD placement approximately 1 month ago. Today she reports she is doing well. Has been sexually active with her partner without problems.  Patient's last menstrual period was 2019.     ROS:     GENERAL: Denies unintentional weight gain or weight loss. Feeling well overall.   GYN: Denies change in discharge or vaginal bleeding.     Physical Exam:      PHYSICAL EXAM:  Visit Vitals    /78    Ht 5' 5" (1.651 m)    Wt 62.4 kg (137 lb 7.3 oz)    LMP 2016 (Exact Date)    BMI 22.87 kg/m2     Body mass index is 22.87 kg/(m^2).     APPEARANCE: Well nourished, well developed, in no acute distress.  ABDOMEN: Soft.  No tenderness or masses.    PELVIC: Normal external genitalia without lesions.  Normal hair distribution.  Adequate perineal body, normal urethral meatus.  Vagina moist and well rugated without lesions or discharge.  Cervix pink, without lesions, discharge or tenderness. IUD strings visible at the cervical os. No significant cystocele or rectocele.  Bimanual exam shows uterus to be normal size, regular, mobile and nontender.  Adnexa without masses or tenderness.    EXTREMITIES: No edema.     Assessment/Plan:     IUD check up    RTC for annual.     Counseling:     Reviewed the length of IUD efficacy, in this case 5 years.   Reviewed barrier contraception to decrease risk of STDs.       "

## 2020-01-27 PROBLEM — Z3A.38 38 WEEKS GESTATION OF PREGNANCY: Status: RESOLVED | Noted: 2019-02-18 | Resolved: 2020-01-27

## 2020-07-30 ENCOUNTER — OFFICE VISIT (OUTPATIENT)
Dept: OBSTETRICS AND GYNECOLOGY | Facility: CLINIC | Age: 35
End: 2020-07-30
Attending: STUDENT IN AN ORGANIZED HEALTH CARE EDUCATION/TRAINING PROGRAM
Payer: MEDICAID

## 2020-07-30 VITALS
DIASTOLIC BLOOD PRESSURE: 78 MMHG | WEIGHT: 274.25 LBS | SYSTOLIC BLOOD PRESSURE: 110 MMHG | HEIGHT: 69 IN | BODY MASS INDEX: 40.62 KG/M2

## 2020-07-30 DIAGNOSIS — Z01.419 WELL WOMAN EXAM: Primary | ICD-10-CM

## 2020-07-30 PROCEDURE — 99395 PR PREVENTIVE VISIT,EST,18-39: ICD-10-PCS | Mod: S$PBB,,, | Performed by: STUDENT IN AN ORGANIZED HEALTH CARE EDUCATION/TRAINING PROGRAM

## 2020-07-30 PROCEDURE — 99999 PR PBB SHADOW E&M-EST. PATIENT-LVL III: CPT | Mod: PBBFAC,,, | Performed by: STUDENT IN AN ORGANIZED HEALTH CARE EDUCATION/TRAINING PROGRAM

## 2020-07-30 PROCEDURE — 99213 OFFICE O/P EST LOW 20 MIN: CPT | Mod: PBBFAC | Performed by: STUDENT IN AN ORGANIZED HEALTH CARE EDUCATION/TRAINING PROGRAM

## 2020-07-30 PROCEDURE — 99999 PR PBB SHADOW E&M-EST. PATIENT-LVL III: ICD-10-PCS | Mod: PBBFAC,,, | Performed by: STUDENT IN AN ORGANIZED HEALTH CARE EDUCATION/TRAINING PROGRAM

## 2020-07-30 PROCEDURE — 99395 PREV VISIT EST AGE 18-39: CPT | Mod: S$PBB,,, | Performed by: STUDENT IN AN ORGANIZED HEALTH CARE EDUCATION/TRAINING PROGRAM

## 2020-07-30 NOTE — PROGRESS NOTES
Chief Complaint: Well Woman Exam     HPI:      Glenis Staples is a 35 y.o.  who presents today for well woman exam.  LMP: No LMP recorded (lmp unknown). (Menstrual status: Birth Control).  No issues, problems, or complaints. Specifically, patient denies abnormal vaginal bleeding, discharge, pelvic pain, urinary problems, or changes in appetite. Ms. Staples is currently sexually active with a single male partner. She is currently using IUD for contraception. She declines STD screening today.    Previous Pap: 8/15/2018 no abnormalities, HPV neg    OB History        1    Para   1    Term   1            AB        Living   1       SAB        TAB        Ectopic        Multiple   0    Live Births   1               Past Medical History:   Diagnosis Date    Acid reflux     HPV in female      Past Surgical History:   Procedure Laterality Date    BREAST SURGERY       Social History     Socioeconomic History    Marital status: Single     Spouse name: Not on file    Number of children: Not on file    Years of education: Not on file    Highest education level: Not on file   Occupational History    Not on file   Social Needs    Financial resource strain: Not on file    Food insecurity     Worry: Not on file     Inability: Not on file    Transportation needs     Medical: Not on file     Non-medical: Not on file   Tobacco Use    Smoking status: Never Smoker    Smokeless tobacco: Never Used   Substance and Sexual Activity    Alcohol use: Not Currently     Frequency: Never    Drug use: No    Sexual activity: Not Currently     Partners: Male     Birth control/protection: I.U.D.   Lifestyle    Physical activity     Days per week: Not on file     Minutes per session: Not on file    Stress: Not on file   Relationships    Social connections     Talks on phone: Not on file     Gets together: Not on file     Attends Christianity service: Not on file     Active member of club or organization: Not on  "file     Attends meetings of clubs or organizations: Not on file     Relationship status: Not on file   Other Topics Concern    Not on file   Social History Narrative    Not on file     Family History   Problem Relation Age of Onset    Breast cancer Paternal Grandmother     Colon cancer Neg Hx     Ovarian cancer Neg Hx        Current Outpatient Medications:     PNV/ferrous fum/docusate/folic (PRENATAL VIT-FE FUM-FA-DSS ORAL), Take by mouth., Disp: , Rfl:     Current Facility-Administered Medications:     levonorgestrel 20 mcg/24 hr (5 years) IUD 1 Intra Uterine Device, 1 Intra Uterine Device, Intrauterine, , Mindy Pascal MD, 1 Intra Uterine Device at 04/16/19 7546    ROS:     GENERAL: Denies unintentional weight gain or weight loss. Feeling well overall.   SKIN: Denies rash or lesions.   HEENT: Denies headaches, or vision changes.   CARDIOVASCULAR: Denies palpitations or chest pain.   RESPIRATORY: Denies shortness of breath or dyspnea on exertion.  BREASTS: Denies pain, lumps, or nipple discharge.   ABDOMEN: Denies abdominal pain, constipation, diarrhea, nausea, vomiting, change in appetite.  URINARY: Denies frequency, dysuria, hematuria.  NEUROLOGIC: Denies syncope or weakness.   PSYCHIATRIC: Denies depression, anxiety or mood swings.    Physical Exam:      PHYSICAL EXAM:  /78   Ht 5' 9" (1.753 m)   Wt 124.4 kg (274 lb 4 oz)   LMP  (LMP Unknown)   BMI 40.50 kg/m²   Body mass index is 40.5 kg/m².     APPEARANCE: Well nourished, well developed, in no acute distress.  PSYCH: Appropriate mood and affect.  SKIN: No acne or hirsutism.  NECK: Neck symmetric without masses or thyromegaly.  NODES: No inguinal, axillary, or supraclavicular lymph node enlargement.  CHEST: Normal respiratory effort.    CARDIOVASCULAR:  Regular rate and rhythm.  BREASTS: Symmetrical, no skin changes or visible lesions.  No palpable masses or nipple discharge bilaterally.  ABDOMEN: Soft.  No tenderness or masses.  "   PELVIC: Normal external genitalia without lesions.  Normal hair distribution.  Adequate perineal body, normal urethral meatus.  Vagina moist and well rugated without lesions or discharge.  Cervix pink, without lesions, discharge or tenderness.  +IUD strings.  No significant cystocele or rectocele.  Bimanual exam shows uterus to be normal size, regular, mobile and nontender.  Adnexa without masses or tenderness.    EXTREMITIES: No edema.  No tenderness to palpation.        35 y.o.     Well woman exam          Counselin.  Annual exam performed today without difficulty.  Patient was counseled today on current ASCCP pap guidelines, the recommendation for yearly pelvic exams, healthy diet and exercise routines, breast self awareness.  Pap smear UTD.  All questions answered.    2.  Contraception:  IUD.  3.  STD testing:  Declines.  4.  Follow up with PCP for other health maintenance.  5.  RTC in 1 year or sooner if needed.    Use of the [facility] Patient Portal discussed and encouraged during today's visit.

## 2022-08-01 NOTE — TELEPHONE ENCOUNTER
Spoke with pharmacy rx sent   Debridement Text: The wound edges were debrided prior to proceeding with the closure to facilitate wound healing.

## 2023-09-19 PROBLEM — K52.9 COLITIS: Status: ACTIVE | Noted: 2023-09-19

## 2023-09-19 PROBLEM — R10.84 GENERALIZED ABDOMINAL PAIN: Status: ACTIVE | Noted: 2023-09-19

## 2023-11-27 ENCOUNTER — TELEPHONE (OUTPATIENT)
Dept: GASTROENTEROLOGY | Facility: CLINIC | Age: 38
End: 2023-11-27
Payer: MEDICAID

## 2023-11-27 NOTE — TELEPHONE ENCOUNTER
I explained to the patient that this is an office visit.    ----- Message from Carloa Ballard LPN sent at 11/27/2023  9:30 AM CST -----  Regarding: FW: Appt  Contact: Pt  [phone]    ----- Message -----  From: Kourtney Marsh  Sent: 11/27/2023   8:41 AM CST  To: Juan [facility]ENS[facility] Staff  Subject: Appt                                             Pt is calling to speak to staff about what to do before appt please call

## 2023-11-28 ENCOUNTER — OFFICE VISIT (OUTPATIENT)
Dept: GASTROENTEROLOGY | Facility: CLINIC | Age: 38
End: 2023-11-28
Payer: MEDICAID

## 2023-11-28 VITALS
HEART RATE: 78 BPM | WEIGHT: 291 LBS | HEIGHT: 69 IN | DIASTOLIC BLOOD PRESSURE: 82 MMHG | SYSTOLIC BLOOD PRESSURE: 129 MMHG | OXYGEN SATURATION: 98 % | BODY MASS INDEX: 43.1 KG/M2

## 2023-11-28 DIAGNOSIS — K52.9 COLITIS: ICD-10-CM

## 2023-11-28 PROCEDURE — 3074F PR MOST RECENT SYSTOLIC BLOOD PRESSURE < 130 MM HG: ICD-10-PCS | Mod: CPTII,,, | Performed by: STUDENT IN AN ORGANIZED HEALTH CARE EDUCATION/TRAINING PROGRAM

## 2023-11-28 PROCEDURE — 99204 OFFICE O/P NEW MOD 45 MIN: CPT | Mod: S$PBB,,, | Performed by: STUDENT IN AN ORGANIZED HEALTH CARE EDUCATION/TRAINING PROGRAM

## 2023-11-28 PROCEDURE — 99999 PR PBB SHADOW E&M-EST. PATIENT-LVL III: CPT | Mod: PBBFAC,,, | Performed by: STUDENT IN AN ORGANIZED HEALTH CARE EDUCATION/TRAINING PROGRAM

## 2023-11-28 PROCEDURE — 99213 OFFICE O/P EST LOW 20 MIN: CPT | Mod: PBBFAC,PN | Performed by: STUDENT IN AN ORGANIZED HEALTH CARE EDUCATION/TRAINING PROGRAM

## 2023-11-28 PROCEDURE — 3079F DIAST BP 80-89 MM HG: CPT | Mod: CPTII,,, | Performed by: STUDENT IN AN ORGANIZED HEALTH CARE EDUCATION/TRAINING PROGRAM

## 2023-11-28 PROCEDURE — 3079F PR MOST RECENT DIASTOLIC BLOOD PRESSURE 80-89 MM HG: ICD-10-PCS | Mod: CPTII,,, | Performed by: STUDENT IN AN ORGANIZED HEALTH CARE EDUCATION/TRAINING PROGRAM

## 2023-11-28 PROCEDURE — 3074F SYST BP LT 130 MM HG: CPT | Mod: CPTII,,, | Performed by: STUDENT IN AN ORGANIZED HEALTH CARE EDUCATION/TRAINING PROGRAM

## 2023-11-28 PROCEDURE — 1159F MED LIST DOCD IN RCRD: CPT | Mod: CPTII,,, | Performed by: STUDENT IN AN ORGANIZED HEALTH CARE EDUCATION/TRAINING PROGRAM

## 2023-11-28 PROCEDURE — 3008F PR BODY MASS INDEX (BMI) DOCUMENTED: ICD-10-PCS | Mod: CPTII,,, | Performed by: STUDENT IN AN ORGANIZED HEALTH CARE EDUCATION/TRAINING PROGRAM

## 2023-11-28 PROCEDURE — 99999 PR PBB SHADOW E&M-EST. PATIENT-LVL III: ICD-10-PCS | Mod: PBBFAC,,, | Performed by: STUDENT IN AN ORGANIZED HEALTH CARE EDUCATION/TRAINING PROGRAM

## 2023-11-28 PROCEDURE — 3008F BODY MASS INDEX DOCD: CPT | Mod: CPTII,,, | Performed by: STUDENT IN AN ORGANIZED HEALTH CARE EDUCATION/TRAINING PROGRAM

## 2023-11-28 PROCEDURE — 1159F PR MEDICATION LIST DOCUMENTED IN MEDICAL RECORD: ICD-10-PCS | Mod: CPTII,,, | Performed by: STUDENT IN AN ORGANIZED HEALTH CARE EDUCATION/TRAINING PROGRAM

## 2023-11-28 PROCEDURE — 99204 PR OFFICE/OUTPT VISIT, NEW, LEVL IV, 45-59 MIN: ICD-10-PCS | Mod: S$PBB,,, | Performed by: STUDENT IN AN ORGANIZED HEALTH CARE EDUCATION/TRAINING PROGRAM

## 2023-11-28 RX ORDER — SODIUM, POTASSIUM,MAG SULFATES 17.5-3.13G
1 SOLUTION, RECONSTITUTED, ORAL ORAL DAILY
Qty: 1 KIT | Refills: 0 | Status: SHIPPED | OUTPATIENT
Start: 2023-11-28 | End: 2023-11-30

## 2023-11-28 RX ORDER — FLUTICASONE PROPIONATE 50 MCG
1 SPRAY, SUSPENSION (ML) NASAL
Status: ON HOLD | COMMUNITY
Start: 2023-11-20 | End: 2023-12-12

## 2023-11-28 RX ORDER — LORATADINE 10 MG/1
10 TABLET ORAL
Status: ON HOLD | COMMUNITY
Start: 2023-11-20 | End: 2023-12-12

## 2023-11-28 NOTE — PROGRESS NOTES
[facility]    Reason for visit: The encounter diagnosis was Colitis.  Referring Provider/PCP: No, Primary Doctor    History of Present Illness:  Glenis Staples is a 38 y.o. female with no significant past medical history who is presenting for initial evaluation of colitis.    Two months ago, the patient presented to the hospital for lower abdominal pain, described as heaviness and pressure, similar to pregnancy.  Denies diarrhea or constipation at the time.  The pain got progressive so on day 5 she presented to the hospital.  On admission, she was afebrile but had a leukocytosis of 16.5, Hgb was normal.  Lipase normal.  She had a CT scan that showed sigmoid colon inflammation, personally reviewed.  She was given antibiotics and her symptoms resolved.  This has never happened before.  She continues to be symptom free and feeling well.  Reports occasionally seeing blood on the toilet paper when she wipes.  Denies a family history of IBD, her grandmother had diverticulitis.  She never had an EGD or colonoscopy.  She is here for follow-up.      Physical Exam:  Constitutional:  not in acute distress and well developed  HENT: Head: Normal, normocephalic, atraumatic.  Eyes: conjunctiva clear and sclera nonicteric  Skin: normal color  Neurological: alert, oriented x3  Psychiatric: mood and affect are within normal limits, pt is a good historian; no memory problems were noted    Laboratory:  See above    Imaging:  See HPI    Endoscopy:  None    Assessment:  Glenis Staples is a 38 y.o. female who is presenting for initial evaluation of abdominal pain, colitis.    Problems:  Sigmoid colitis    The patient presented to the hospital with lower abdominal pain, imaging showed sigmoid colitis.  Differential includes diverticulitis, infectious colitis, ischemic, IBD.  Her symptoms have now resolved.  Will plan for a colonoscopy to ensure resolution of inflammation and rule out any underlying  masses.      Plan:  Colonoscopy, higher than average risk due to BMI    Ladan Martinez MD  Gastroenterology and Hepatology    No orders of the defined types were placed in this encounter.

## 2025-05-28 DIAGNOSIS — E66.9 OBESITY, UNSPECIFIED: Primary | ICD-10-CM

## 2025-07-03 ENCOUNTER — OFFICE VISIT (OUTPATIENT)
Dept: OBSTETRICS AND GYNECOLOGY | Facility: CLINIC | Age: 40
End: 2025-07-03
Payer: MEDICAID

## 2025-07-03 VITALS
HEIGHT: 69 IN | DIASTOLIC BLOOD PRESSURE: 75 MMHG | BODY MASS INDEX: 38.49 KG/M2 | WEIGHT: 259.88 LBS | SYSTOLIC BLOOD PRESSURE: 104 MMHG

## 2025-07-03 DIAGNOSIS — Z01.419 WELL WOMAN EXAM WITH ROUTINE GYNECOLOGICAL EXAM: Primary | ICD-10-CM

## 2025-07-03 DIAGNOSIS — Z12.4 CERVICAL CANCER SCREENING: ICD-10-CM

## 2025-07-03 DIAGNOSIS — Z30.432 ENCOUNTER FOR IUD REMOVAL: ICD-10-CM

## 2025-07-03 PROCEDURE — 99999 PR PBB SHADOW E&M-EST. PATIENT-LVL III: CPT | Mod: PBBFAC,,,

## 2025-07-03 PROCEDURE — 99213 OFFICE O/P EST LOW 20 MIN: CPT | Mod: PBBFAC,PN

## 2025-07-03 PROCEDURE — 58301 REMOVE INTRAUTERINE DEVICE: CPT | Mod: PBBFAC,PN

## 2025-07-03 RX ORDER — FLUTICASONE PROPIONATE 50 MCG
1 SPRAY, SUSPENSION (ML) NASAL
COMMUNITY

## 2025-07-03 NOTE — PROCEDURES
Removal of IUD    Date/Time: 7/3/2025 2:40 PM    Performed by: Emiliana Irvin FNP-C  Authorized by: Emiliana Irvin FNP-C    Consent obtained:  Prior to procedure the appropriate consent was completed and verified  Consent given by:  Patient  Procedure risks and benefits discussed: yes    Patient questions answered: yes    Patient agrees, verbalizes understanding, and wants to proceed: yes    Implant grasped by: ring forceps  Other reason for removal:  Desires pregnancy.  Removed with no complications: yes     ANGELINA Hernandes  SBPH [facility]  OB/[facility]N Clinic

## 2025-07-03 NOTE — PROGRESS NOTES
CC: Annual / Well Woman Exam    HPI:   Pt is a 40 y.o.  female who presents for routine annual exam. She uses Mirena IUD for contraception. She does not want STD screening today. She is sexually active with 1 male partner. Denies regular condom use with current partner. Would like to try to conceive and wants IUD removed if possible today.   Reports menstrual cycles occur irregularly since IUD insertion. Reports menstrual flow as spotting when it occurs and cramping as not painful.   Denies pain with sex, changes in/pain with bowel/bladder habits, changes in appetite, or headaches. She reports she is safe at home.  Denies abnormal vaginal bleeding, vaginal dryness, abnormal vaginal discharge, vaginal itching/irritation, vaginal odor, burning with urination, or urinary frequency.     LMP: No LMP recorded. Patient has had an implant.     Past Medical History:   Diagnosis Date    Acid reflux     HPV in female     Sciatica      Past Surgical History:   Procedure Laterality Date    BREAST SURGERY      COLONOSCOPY  2023    COLONOSCOPY N/A 2023    Procedure: COLONOSCOPY;  Surgeon: Ladan Martinez MD;  Location: [facility];  Service: Gastroenterology;  Laterality: N/A;     Current Medications[1]    Review of patient's allergies indicates:   Allergen Reactions    Hydrocodone-acetaminophen Itching and Nausea And Vomiting      FH:   Breast cancer: Paternal grandmother  Colon cancer: none  Ovarian cancer: none  Uterine cancer: none    Last pap smear:   NILM  / HPV-  History of abnormal pap smears: x1 remote (early 20s) had normal result since per patient.   Colonoscopy: Never  DEXA: Never  Mammogram: Never  STD history: Never  Birth control: Mirena IUD since 2019.  OB history:    Tobacco use: Never    ROS:  GENERAL: Feeling well overall. Denies fever or chills.   SKIN: Denies rash or lesions.   HEAD: Denies head injury or headache.   NODES: Denies enlarged lymph nodes.   CHEST: Denies chest  pain or shortness of breath.   CARDIOVASCULAR: Denies palpitations or left sided chest pain.   ABDOMEN: No abdominal pain, constipation, diarrhea, nausea, vomiting or rectal bleeding.   URINARY: No dysuria, hematuria, or burning on urination.  REPRODUCTIVE: See HPI.   BREASTS: Denies pain, lumps, or nipple discharge.   HEMATOLOGIC: No easy bruisability or excessive bleeding.   MUSCULOSKELETAL: Denies joint pain or swelling.   NEUROLOGIC: Denies syncope or weakness.   PSYCHIATRIC: Denies depression, anxiety or mood swings.    PE: Female chaperone present for exam  APPEARANCE: Well nourished, well developed, female in no acute distress.  NODES: no cervical, supraclavicular, or inguinal lymphadenopathy  BREASTS: Symmetrical, no skin changes or visible lesions. No palpable masses, nipple discharge or adenopathy bilaterally.  ABDOMEN: Soft. No tenderness or masses. No distention. No hernias palpated.   VULVA: No lesions. Normal external female genitalia.  LABIA:         RIGHT: No rash, tenderness or lesion.         LEFT: No rash, tenderness or lesion.   URETHRA: No masses, tenderness, or prolapse.  VAGINA: Normal. No vaginal discharge, tenderness or bleeding.   UTERUS:  Normal in size/position. Non-tender and mobile on exam.   CERVIX: Moist. No rashes/lesions, abnormal discharge. Non-tender on exam. IUD strings noted 2-3 cm below external cervical os.  ADNEXA: Normal in size. No masses tenderness on palpation.  ANUS/PERINEUM: Normal. No rashes, lesions on exam.    Diagnosis:  1. Well woman exam with routine gynecological exam    2. Cervical cancer screening    3. Encounter for IUD removal      Plan:   Orders Placed This Encounter    Liquid-Based Pap Smear, Screening     - Pap smear (co-test) collected.  - IUD removed with ring forceps. Consents signed - discussed. Does not want replacement contraceptive as desires pregnancy. IUD removed without complications see quick procedure note.  - Start taking prenatal with folic  acid while trying to conceive as cycles return to normal within 1st month and can get pregnant soon after IUD removal. NTDs occur early on in pregnancy and taking prenatal while TTC can prevent NTDs.     Patient was counseled today on the new ACS guidelines for cervical cytology screening as well as the current recommendations for breast cancer screening. She was counseled to follow up with her PCP for other routine health maintenance. Counseling session lasted approximately 10 minutes, and all her questions were answered.  For women over the age of 65, you can stop having cervical cancer screenings if you have never had abnormal cervical cells or cervical cancer, and youve had three negative Pap tests in a row. (You also can stop screening if youve had two negative Pap and HPV tests in a row in the past 10 years, with at least one test in the past 5 years.)    Follow-up with me in 1 year for routine well woman exam; pap in 3 years.        ABDOUL Hernandes  [facility]          [1]   Current Outpatient Medications   Medication Sig Dispense Refill    fluticasone propionate (FLONASE) 50 mcg/actuation nasal spray 1 spray by Each Nostril route as needed for Allergies. Shake Liquid and use       Current Facility-Administered Medications   Medication Dose Route Frequency Provider Last Rate Last Admin    levonorgestrel 20 mcg/24 hr (5 years) IUD 1 Intra Uterine Device  1 Intra Uterine Device Intrauterine  Mindy Pascal MD   1 Intra Uterine Device at 04/16/19 1153

## 2025-07-10 ENCOUNTER — NUTRITION (OUTPATIENT)
Dept: NUTRITION | Facility: CLINIC | Age: 40
End: 2025-07-10
Payer: MEDICAID

## 2025-07-10 VITALS — HEIGHT: 69 IN | BODY MASS INDEX: 39.27 KG/M2 | WEIGHT: 265.13 LBS

## 2025-07-10 DIAGNOSIS — E66.9 OBESITY, UNSPECIFIED: ICD-10-CM

## 2025-07-10 DIAGNOSIS — Z71.3 DIETARY COUNSELING: ICD-10-CM

## 2025-07-10 DIAGNOSIS — E66.9 OBESITY (BMI 30-39.9): Primary | ICD-10-CM

## 2025-07-10 PROCEDURE — 99212 OFFICE O/P EST SF 10 MIN: CPT | Mod: PBBFAC,PN

## 2025-07-10 PROCEDURE — 97802 MEDICAL NUTRITION INDIV IN: CPT | Mod: PBBFAC,PN | Performed by: DIETITIAN, REGISTERED

## 2025-07-10 PROCEDURE — 99999 PR PBB SHADOW E&M-EST. PATIENT-LVL II: CPT | Mod: PBBFAC,,,

## 2025-07-10 PROCEDURE — 99999PBSHW PR PBB SHADOW TECHNICAL ONLY FILED TO HB: Mod: PBBFAC,,,

## 2025-07-10 NOTE — PROGRESS NOTES
"Referring Physician:Order, Paper     Reason for visit:  Chief Complaint   Patient presents with    Obesity    Nutrition Counseling      Initial Visit    :1985     Allergies Reviewed  Meds Reviewed    Anthropometrics  Weight:120.2 kg (265 lb 1.7 oz)  Height:5' 9" (1.753 m)  BMI:Body mass index is 39.15 kg/m².   IBW: 66.2 kg  +/-10%    Meds:Medications Prior to Visit[1]    Food/Drug Interactions Noted:  n/a    Vitamins/Supplements/Herbs:  n/a    Labs: n/a     Nutrition Prescription: 1985 Kcals/day( 30 kcal/kg IBW),  53-66 g protein( 0.8-1.0 g/kg IBW)     Support System:   pt does the grocery shopping and cooking for herself, her  and 7 yo daughter.  Works at [employer].    Diet Hx:  pt states she lost 40 lbs taking "the shot" (Zepbound).  Has stopped taking it, and subsequently has regained 10 lbs.  Downloaded [employer] kanika today at recommendation of coworker.  States she never feels "full" and tends to overeat.  Rarely snacks between meals.  Drinks Coke Zero; water; coffee.       Breakfast: 2 scrambled eggs, 2 strips ibanez, grits.  Coffee with Equal and creamer  Lunch: can't remember if she ate lunch today; may eat a sandwich.  States she did eat a banana.  Dinner:  last night had leftovers:  slice of pizza, guac & baked chips, water.    Current activity level and/or physical limitations:  walks about 1 mile twice a week    Motivation to make changes/anticipated barriers and/or expected adherence:  no barriers to adherence identified    Nutrition-Focus Physical Findings:  pt appears well nourished    Assessment:  pt attentive and asked relevant questions about foods/beverages recommended and to avoid; reading food labels; sample meal plan and menus; portion control; grocery shopping and cooking tips; healthy snacks; role of exercise in weight management.  All of her questions were answered, and she verbalized understanding of information.    Nutrition Diagnosis: obesity RT excess energy intake " and physical inactivity AEB BMI 39    Recommendations:  1500 calorie, low fat, high fiber diet. Exercise goal:  30 minutes per day, 3-5 days per week.  Handouts provided and reviewed:  My Plate Planner; Cardiac-TLC Nutrition Therapy; 1500 Calorie Sample 5-Day Menus; Servings of Carbohydrates for Meal Planning; Reading A Food Label; Tips to Support Weight Loss; Weight Loss Tips, Heart-Healthy Cooking Tips, Heart Healthy Shopping Tips;  Eat Fit Plan...Anytime/Anywhere; Shop Fit-Get Fit Shopping List; [facility] Snack List for Diabetes; Exercise & Weight-Losing It and Keeping It Off (NLA); Achieving A Healthy Weight (NLA); Setting Goals for Weight Management      Strategies Implemented:  portion control; keep a written food journal    Consultation Time:45 minutes.  Communicated with referring healthcare provider:  Consult note available in pt's Epic chart per MD discretion  Follow Up:  Pt provided with dietitian contact information and advised to contact me with questions or make future appointment if further intervention needed.                         [1]   Outpatient Medications Prior to Visit   Medication Sig Dispense Refill    fluticasone propionate (FLONASE) 50 mcg/actuation nasal spray 1 spray by Each Nostril route as needed for Allergies. Shake Liquid and use       No facility-administered medications prior to visit.